# Patient Record
Sex: FEMALE | Race: WHITE | NOT HISPANIC OR LATINO | Employment: OTHER | URBAN - METROPOLITAN AREA
[De-identification: names, ages, dates, MRNs, and addresses within clinical notes are randomized per-mention and may not be internally consistent; named-entity substitution may affect disease eponyms.]

---

## 2017-03-15 RX ORDER — OMEPRAZOLE 20 MG/1
CAPSULE, DELAYED RELEASE ORAL
Qty: 90 CAP | Refills: 2 | Status: SHIPPED | OUTPATIENT
Start: 2017-03-15 | End: 2017-12-10 | Stop reason: SDUPTHER

## 2017-03-15 RX ORDER — OXYBUTYNIN CHLORIDE 5 MG/1
TABLET ORAL
Qty: 180 TAB | Refills: 2 | Status: SHIPPED | OUTPATIENT
Start: 2017-03-15 | End: 2017-12-10 | Stop reason: SDUPTHER

## 2017-03-15 RX ORDER — AMLODIPINE BESYLATE 10 MG/1
TABLET ORAL
Qty: 90 TAB | Refills: 2 | Status: SHIPPED | OUTPATIENT
Start: 2017-03-15 | End: 2017-12-10 | Stop reason: SDUPTHER

## 2017-03-22 RX ORDER — LEVOTHYROXINE SODIUM 0.03 MG/1
TABLET ORAL
Qty: 90 TAB | Refills: 0 | Status: SHIPPED | OUTPATIENT
Start: 2017-03-22 | End: 2017-06-19 | Stop reason: SDUPTHER

## 2017-05-09 ENCOUNTER — HOSPITAL ENCOUNTER (OUTPATIENT)
Dept: RADIOLOGY | Facility: MEDICAL CENTER | Age: 82
End: 2017-05-09
Attending: SPECIALIST
Payer: MEDICARE

## 2017-05-09 DIAGNOSIS — C50.912 MALIGNANT NEOPLASM OF LEFT FEMALE BREAST, UNSPECIFIED SITE OF BREAST: ICD-10-CM

## 2017-05-09 PROCEDURE — G0204 DX MAMMO INCL CAD BI: HCPCS

## 2017-05-11 ENCOUNTER — HOSPITAL ENCOUNTER (OUTPATIENT)
Dept: LAB | Facility: MEDICAL CENTER | Age: 82
End: 2017-05-11
Attending: SPECIALIST
Payer: MEDICARE

## 2017-05-11 LAB
ALBUMIN SERPL BCP-MCNC: 4.6 G/DL (ref 3.2–4.9)
ALBUMIN/GLOB SERPL: 1.8 G/DL
ALP SERPL-CCNC: 73 U/L (ref 30–99)
ALT SERPL-CCNC: 19 U/L (ref 2–50)
ANION GAP SERPL CALC-SCNC: 7 MMOL/L (ref 0–11.9)
AST SERPL-CCNC: 24 U/L (ref 12–45)
BASOPHILS # BLD AUTO: 1 % (ref 0–1.8)
BASOPHILS # BLD: 0.05 K/UL (ref 0–0.12)
BILIRUB SERPL-MCNC: 0.8 MG/DL (ref 0.1–1.5)
BUN SERPL-MCNC: 17 MG/DL (ref 8–22)
CALCIUM SERPL-MCNC: 9.5 MG/DL (ref 8.5–10.5)
CHLORIDE SERPL-SCNC: 94 MMOL/L (ref 96–112)
CO2 SERPL-SCNC: 28 MMOL/L (ref 20–33)
CREAT SERPL-MCNC: 1.01 MG/DL (ref 0.5–1.4)
EOSINOPHIL # BLD AUTO: 0.12 K/UL (ref 0–0.51)
EOSINOPHIL NFR BLD: 2.3 % (ref 0–6.9)
ERYTHROCYTE [DISTWIDTH] IN BLOOD BY AUTOMATED COUNT: 39.8 FL (ref 35.9–50)
GFR SERPL CREATININE-BSD FRML MDRD: 53 ML/MIN/1.73 M 2
GLOBULIN SER CALC-MCNC: 2.6 G/DL (ref 1.9–3.5)
GLUCOSE SERPL-MCNC: 99 MG/DL (ref 65–99)
HCT VFR BLD AUTO: 39.9 % (ref 37–47)
HGB BLD-MCNC: 13.5 G/DL (ref 12–16)
IMM GRANULOCYTES # BLD AUTO: 0.01 K/UL (ref 0–0.11)
IMM GRANULOCYTES NFR BLD AUTO: 0.2 % (ref 0–0.9)
LYMPHOCYTES # BLD AUTO: 1.38 K/UL (ref 1–4.8)
LYMPHOCYTES NFR BLD: 26.7 % (ref 22–41)
MCH RBC QN AUTO: 29.3 PG (ref 27–33)
MCHC RBC AUTO-ENTMCNC: 33.8 G/DL (ref 33.6–35)
MCV RBC AUTO: 86.6 FL (ref 81.4–97.8)
MONOCYTES # BLD AUTO: 0.47 K/UL (ref 0–0.85)
MONOCYTES NFR BLD AUTO: 9.1 % (ref 0–13.4)
NEUTROPHILS # BLD AUTO: 3.14 K/UL (ref 2–7.15)
NEUTROPHILS NFR BLD: 60.7 % (ref 44–72)
NRBC # BLD AUTO: 0 K/UL
NRBC BLD AUTO-RTO: 0 /100 WBC
PLATELET # BLD AUTO: 229 K/UL (ref 164–446)
PMV BLD AUTO: 12.1 FL (ref 9–12.9)
POTASSIUM SERPL-SCNC: 3.8 MMOL/L (ref 3.6–5.5)
PROT SERPL-MCNC: 7.2 G/DL (ref 6–8.2)
RBC # BLD AUTO: 4.61 M/UL (ref 4.2–5.4)
SODIUM SERPL-SCNC: 129 MMOL/L (ref 135–145)
WBC # BLD AUTO: 5.2 K/UL (ref 4.8–10.8)

## 2017-05-11 PROCEDURE — 80053 COMPREHEN METABOLIC PANEL: CPT

## 2017-05-11 PROCEDURE — 36415 COLL VENOUS BLD VENIPUNCTURE: CPT

## 2017-05-11 PROCEDURE — 85025 COMPLETE CBC W/AUTO DIFF WBC: CPT

## 2017-06-23 RX ORDER — HYDROCHLOROTHIAZIDE 12.5 MG/1
TABLET ORAL
Qty: 90 TAB | Refills: 0 | Status: SHIPPED | OUTPATIENT
Start: 2017-06-23 | End: 2017-09-20 | Stop reason: SDUPTHER

## 2017-09-22 RX ORDER — HYDROCHLOROTHIAZIDE 12.5 MG/1
12.5 TABLET ORAL DAILY
Qty: 90 TAB | Refills: 1 | Status: SHIPPED | OUTPATIENT
Start: 2017-09-22 | End: 2018-02-01

## 2017-09-22 NOTE — TELEPHONE ENCOUNTER
Refill X 6 months, sent to pharmacy.Pt. Seen in the last 6 months per protocol.   Lab Results   Component Value Date/Time    SODIUM 129 (L) 05/11/2017 11:26 AM    POTASSIUM 3.8 05/11/2017 11:26 AM    CHLORIDE 94 (L) 05/11/2017 11:26 AM    CO2 28 05/11/2017 11:26 AM    GLUCOSE 99 05/11/2017 11:26 AM    BUN 17 05/11/2017 11:26 AM    CREATININE 1.01 05/11/2017 11:26 AM    CREATININE 1.3 11/06/2007 12:42 PM

## 2018-02-01 ENCOUNTER — OFFICE VISIT (OUTPATIENT)
Dept: CARDIOLOGY | Facility: MEDICAL CENTER | Age: 83
End: 2018-02-01
Payer: COMMERCIAL

## 2018-02-01 VITALS
HEIGHT: 66 IN | HEART RATE: 83 BPM | SYSTOLIC BLOOD PRESSURE: 140 MMHG | OXYGEN SATURATION: 95 % | BODY MASS INDEX: 25.23 KG/M2 | WEIGHT: 157 LBS | DIASTOLIC BLOOD PRESSURE: 66 MMHG

## 2018-02-01 DIAGNOSIS — E78.00 HYPERCHOLESTEREMIA: ICD-10-CM

## 2018-02-01 DIAGNOSIS — I50.32 CHRONIC DIASTOLIC CONGESTIVE HEART FAILURE (HCC): ICD-10-CM

## 2018-02-01 DIAGNOSIS — I10 ESSENTIAL HYPERTENSION: ICD-10-CM

## 2018-02-01 DIAGNOSIS — I35.0 AORTIC VALVE STENOSIS, ETIOLOGY OF CARDIAC VALVE DISEASE UNSPECIFIED: ICD-10-CM

## 2018-02-01 LAB — EKG IMPRESSION: NORMAL

## 2018-02-01 PROCEDURE — 93000 ELECTROCARDIOGRAM COMPLETE: CPT | Performed by: INTERNAL MEDICINE

## 2018-02-01 PROCEDURE — 99205 OFFICE O/P NEW HI 60 MIN: CPT | Mod: 25 | Performed by: INTERNAL MEDICINE

## 2018-02-01 RX ORDER — ALENDRONATE SODIUM 35 MG/1
70 TABLET ORAL
COMMUNITY

## 2018-02-01 RX ORDER — POTASSIUM CHLORIDE 1.5 G/1.58G
20 POWDER, FOR SOLUTION ORAL DAILY
COMMUNITY

## 2018-02-01 RX ORDER — LEVOTHYROXINE SODIUM 112 UG/1
75 TABLET ORAL
COMMUNITY

## 2018-02-01 RX ORDER — ZINC 25 MG
1 TABLET ORAL DAILY
COMMUNITY

## 2018-02-01 RX ORDER — WARFARIN SODIUM 2 MG/1
2 TABLET ORAL 3 TIMES DAILY
Status: ON HOLD | COMMUNITY
End: 2018-03-21

## 2018-02-01 RX ORDER — ATORVASTATIN CALCIUM 40 MG/1
40 TABLET, FILM COATED ORAL NIGHTLY
COMMUNITY

## 2018-02-01 RX ORDER — RANITIDINE 150 MG/1
150 CAPSULE ORAL
COMMUNITY

## 2018-02-01 RX ORDER — METOPROLOL TARTRATE 50 MG/1
50 TABLET, FILM COATED ORAL 2 TIMES DAILY
COMMUNITY

## 2018-02-01 RX ORDER — MULTIVIT-MIN/IRON/FOLIC ACID/K 18-600-40
1 CAPSULE ORAL DAILY
COMMUNITY

## 2018-02-01 ASSESSMENT — ENCOUNTER SYMPTOMS
INSOMNIA: 1
EYES NEGATIVE: 1
NAUSEA: 0
CLAUDICATION: 0
DEPRESSION: 0
SORE THROAT: 1
PALPITATIONS: 0
MYALGIAS: 0
VOMITING: 0
HEADACHES: 0
CHILLS: 0
BACK PAIN: 1
COUGH: 1
WEIGHT LOSS: 1
DOUBLE VISION: 0
ABDOMINAL PAIN: 1
TINGLING: 1
WEAKNESS: 1
FOCAL WEAKNESS: 0
NERVOUS/ANXIOUS: 0
HEARTBURN: 1
BLURRED VISION: 0
MEMORY LOSS: 1
CONSTIPATION: 1
DIZZINESS: 1
SHORTNESS OF BREATH: 1
FEVER: 0
BRUISES/BLEEDS EASILY: 0

## 2018-02-01 NOTE — LETTER
[facility]   [address], [address]  [place] [address]  Phone: [phone]  Fax: [phone]              Verónica Krishnan  1935    Encounter Date: 2/1/2018    Napoleon Pepe M.D.          PROGRESS NOTE:  Subjective:   Verónica Krishnan is a 82 y.o. female who presents today for interventional consult/TAVR evaluation requested by Tai Ramesh for severe symptomatic aortic stenosis.    Thank you for allowing me to evaluate Mrs. Krishnan, who as you know is a 82 year old female with cardiac condition including aortic stenosis. She was recently admitted to [facility] for congestive heart failure. She admits to mild shortness of breath, dyspnea on exertion, fatigue, chest pain, dizziness.    Past Medical History:   Diagnosis Date   • Aortic stenosis    • Breath shortness    • Cancer (CMS-HCC) 2015    breast   • Dental disorder     upper and lower plate   • Heart burn    • Heart valve disease    • Hyperlipidemia    • Hypertension    • Hypoglycemia 2015    patient reports   • Indigestion    • Jaundice 1937   • Pneumonia    • Unspecified cataract     bilateral IOL   • Unspecified urinary incontinence    • Urinary bladder disorder      Past Surgical History:   Procedure Laterality Date   • MASTECTOMY Left 6/17/2015    Procedure: MASTECTOMY PARTIAL;  Surgeon: Kathy Holley M.D.;  Location: SURGERY SAME DAY [facility] ORS;  Service:    • NODE BIOPSY SENTINEL Left 6/17/2015    Procedure: NODE BIOPSY SENTINEL W/LYMPH NODE MAPPING;  Surgeon: Kathy Holley M.D.;  Location: SURGERY SAME DAY [facility] ORS;  Service:      Family History   Problem Relation Age of Onset   • Heart Attack Mother      History   Smoking Status   • Former Smoker   • Years: 15.00   • Types: Cigarettes   • Quit date: 1/1/1970   Smokeless Tobacco   • Never Used     Allergies   Allergen Reactions   • Aluminum      Sore throat, nausea, dizzy   • Amlodipine    • Iron Vomiting, Nausea and  Swelling   • Lisinopril Swelling   • Other Food      Oranges     Medications reviewed.    Outpatient Encounter Prescriptions as of 2/1/2018   Medication Sig Dispense Refill   • alendronate (FOSAMAX) 35 MG tablet Take 35 mg by mouth every 7 days.     • atorvastatin (LIPITOR) 40 MG Tab Take 40 mg by mouth every evening.     • Furosemide 40 MG/4ML Solution Take  by mouth.     • levothyroxine (SYNTHROID) 112 MCG Tab Take 112 mcg by mouth Every morning on an empty stomach.     • metoprolol (LOPRESSOR) 50 MG Tab Take 50 mg by mouth 2 times a day.     • potassium chloride (KLOR-CON) 20 MEQ Pack Take 20 mEq by mouth 2 times a day.     • ranitidine (ZANTAC) 150 MG capsule Take  by mouth.     • warfarin (COUMADIN) 2 MG Tab Take 2 mg by mouth every day. 3 tabs four days a week, 2.5 tabs three times a week     • Ascorbic Acid (VITAMIN C) 500 MG Cap Take  by mouth.     • aspirin 81 MG tablet Take 81 mg by mouth every day.     • LUTEIN PO Take 4 mg by mouth.     • MAGNESIUM CITRATE PO Take  by mouth.     • Zinc 25 MG Tab Take  by mouth.     • oxybutynin (DITROPAN) 5 MG Tab TAKE 1 TABLET TWICE A  Tab 0   • anastrozole (ARIMIDEX) 1 MG Tab      • diphenhydrAMINE (BENADRYL) 25 MG TABS Take 25 mg by mouth at bedtime as needed for Sleep.     • Probiotic Product (PROBIOTIC ACIDOPHILUS BEADS PO) Take  by mouth every day.     • Calcium Carbonate 600 MG TABS Take  by mouth every day.     • Cholecalciferol (VITAMIN D) 400 UNIT TABS Take 400 Units by mouth every day.     • Cyanocobalamin (VITAMIN B-12) 5000 MCG SUBL Place  under tongue every day.     • Non Formulary Request Hydrauronic Acid   500mg  Once a day     • [DISCONTINUED] omeprazole (PRILOSEC) 20 MG delayed-release capsule TAKE 1 CAPSULE DAILY 90 Cap 0   • [DISCONTINUED] amlodipine (NORVASC) 10 MG Tab TAKE 1 TABLET DAILY 90 Tab 0   • [DISCONTINUED] hydrochlorothiazide (HYDRODIURIL) 12.5 MG tablet Take 1 Tab by mouth every day. 90 Tab 1   • [DISCONTINUED] levothyroxine  (SYNTHROID) 25 MCG Tab TAKE 1 TABLET EVERY MORNING ON AN EMPTY STOMACH (DUE FOR LABS/FOLLOW UP FOR FURTHER REFILL) 30 Tab 0   • [DISCONTINUED] oxycodone immediate-release (ROXICODONE) 5 MG TABS Take 1 Tab by mouth every four hours as needed for Severe Pain. 30 Tab 0   • CRANBERRY CONCENTRATE PO Take 2,500 mg by mouth every day.     • [DISCONTINUED] Non Formulary Request Relacore Extra  3 pills in the am     • [DISCONTINUED] loratadine (CLARITIN) 10 MG TABS Take 10 mg by mouth every day.     • [DISCONTINUED] Aloe Vera 500 MG CAPS Take  by mouth. States taking 5000 mg 2 pills in the am     • [DISCONTINUED] Garlic 1000 MG CAPS Take  by mouth every day.     • [DISCONTINUED] Ginger, Zingiber officinalis, 550 MG CAPS Take  by mouth every day.     • [DISCONTINUED] Non Formulary Request Fennel Seed  480 mg  daily     • [DISCONTINUED] Zinc Gluconate 50 MG CAPS Take 25 mg by mouth every day.     • [DISCONTINUED] Misc Natural Products (LUTEIN 20 PO) Take  by mouth every day.     • [DISCONTINUED] Non Formulary Request Sea Kelp   450 mg daily     • [DISCONTINUED] Non Formulary Request Tumeric 555mg  daily     • [DISCONTINUED] Non Formulary Request Iodine  30mg once  daily     • [DISCONTINUED] Non Formulary Request Senol    Once a day     • [DISCONTINUED] Omega-3 Fatty Acids (OMEGA-3 PLUS PO) Take  by mouth every day.     • [DISCONTINUED] Non Formulary Request All Day Energy Drink   Once daily       No facility-administered encounter medications on file as of 2/1/2018.      Review of Systems   Constitutional: Positive for malaise/fatigue and weight loss. Negative for chills and fever.   HENT: Positive for congestion and sore throat. Negative for hearing loss.    Eyes: Negative.  Negative for blurred vision and double vision.   Respiratory: Positive for cough and shortness of breath.    Cardiovascular: Positive for chest pain and leg swelling. Negative for palpitations and claudication.   Gastrointestinal: Positive for abdominal  "pain, constipation and heartburn. Negative for nausea and vomiting.   Genitourinary: Negative.  Negative for dysuria and urgency.   Musculoskeletal: Positive for back pain and joint pain. Negative for myalgias.   Skin: Positive for itching. Negative for rash.   Neurological: Positive for dizziness, tingling and weakness. Negative for focal weakness and headaches.   Endo/Heme/Allergies: Positive for environmental allergies. Does not bruise/bleed easily.   Psychiatric/Behavioral: Positive for memory loss. Negative for depression. The patient has insomnia. The patient is not nervous/anxious.         Objective:   /66   Pulse 83   Ht 1.676 m (5' 6\")   Wt 71.2 kg (157 lb)   SpO2 95%   BMI 25.34 kg/m²      Physical Exam   Constitutional: She is oriented to person, place, and time. She appears well-developed and well-nourished.   HENT:   Head: Normocephalic and atraumatic.   Eyes: Conjunctivae are normal. Pupils are equal, round, and reactive to light.   Neck: Normal range of motion. Neck supple.   Cardiovascular: Normal rate and regular rhythm.    Murmur heard.  Pulmonary/Chest: Effort normal and breath sounds normal.   Abdominal: Soft. Bowel sounds are normal.   Musculoskeletal: Normal range of motion. She exhibits no edema.   Neurological: She is alert and oriented to person, place, and time.   Skin: Skin is warm and dry.   Psychiatric: She has a normal mood and affect.     CARDIAC STUDIES/PROCEDURES:    CAROTID ULTRASOUND (12/13/17)  Carotid ultrasound showing mild plaques.    EKG was ordered for aortic stenosis, performed on (02/01/18) and reviewed: EKG shows sinus rhythm.    Assessment:     1. Aortic valve stenosis, etiology of cardiac valve disease unspecified     2. Chronic diastolic congestive heart failure (CMS-HCC)     3. Essential hypertension     4. Hypercholesteremia       Medical Decision Making:  Today's Assessment / Status / Plan:     1. Aortic stenosis: She is symptomatic, NYHA class II. We will " obtain her echocardiogram and follow up with her. If her aortic stenosis is severe, we will proceed with cardiac catheterization and refer her to Dr. Vargas for probable TAVR vs SAVR.  2. Chronic diastolic congestive heart failure: The overall volume status is adequate.  3. Hypertension: Blood pressure is well controlled.  4. Hyperlipidemia: She is doing well on statin therapy without myalgia symptoms.  More than 45 minutes of time was spent to review all above information, discuss the option of cardiac catheterization and TAVR including, alternative options, risk and benefits.    The risks, benefits, and alternatives to coronary angiography with IV sedation were discussed in great detail. Specific risks mentioned include bleeding, infection, kidney damage, allergic reaction, cardiac perforation with possible tamponade requiring celi-cardiocentesis or possible open heart surgery. In addition, we discussed that 10% of patients will experience small to moderate bruising at the side of the arterial puncture. Lastly the risks of heart attack, stroke, and death were discussed; the risks of major complications such as heart attack or stroke caused by the angiogram is less than 1%; the risk of death is approximately 1 in 1000. The patient verbalized understanding of these potential complications and wishes to proceed with this procedure.    We will follow up in one month in valve clinic.    Thank you for this consult.                  No Recipients

## 2018-02-02 NOTE — PROGRESS NOTES
Subjective:   Verónica Krishnan is a 82 y.o. female who presents today for interventional consult/TAVR evaluation requested by Tai Ramesh for severe symptomatic aortic stenosis.    Thank you for allowing me to evaluate Mrs. Krishnan, who as you know is a 82 year old female with cardiac condition including aortic stenosis. She was recently admitted to [facility] for congestive heart failure. She admits to mild shortness of breath, dyspnea on exertion, fatigue, chest pain, dizziness.    Past Medical History:   Diagnosis Date   • Aortic stenosis    • Breath shortness    • Cancer (CMS-HCC) 2015    breast   • Dental disorder     upper and lower plate   • Heart burn    • Heart valve disease    • Hyperlipidemia    • Hypertension    • Hypoglycemia 2015    patient reports   • Indigestion    • Jaundice 1937   • Pneumonia    • Unspecified cataract     bilateral IOL   • Unspecified urinary incontinence    • Urinary bladder disorder      Past Surgical History:   Procedure Laterality Date   • MASTECTOMY Left 6/17/2015    Procedure: MASTECTOMY PARTIAL;  Surgeon: Kathy Holley M.D.;  Location: SURGERY SAME DAY [facility] ORS;  Service:    • NODE BIOPSY SENTINEL Left 6/17/2015    Procedure: NODE BIOPSY SENTINEL W/LYMPH NODE MAPPING;  Surgeon: Kathy Holley M.D.;  Location: SURGERY SAME DAY [facility] ORS;  Service:      Family History   Problem Relation Age of Onset   • Heart Attack Mother      History   Smoking Status   • Former Smoker   • Years: 15.00   • Types: Cigarettes   • Quit date: 1/1/1970   Smokeless Tobacco   • Never Used     Allergies   Allergen Reactions   • Aluminum      Sore throat, nausea, dizzy   • Amlodipine    • Iron Vomiting, Nausea and Swelling   • Lisinopril Swelling   • Other Food      Oranges     Medications reviewed.    Outpatient Encounter Prescriptions as of 2/1/2018   Medication Sig Dispense Refill   • alendronate (FOSAMAX) 35 MG tablet Take 35 mg by mouth every 7 days.     • atorvastatin  (LIPITOR) 40 MG Tab Take 40 mg by mouth every evening.     • Furosemide 40 MG/4ML Solution Take  by mouth.     • levothyroxine (SYNTHROID) 112 MCG Tab Take 112 mcg by mouth Every morning on an empty stomach.     • metoprolol (LOPRESSOR) 50 MG Tab Take 50 mg by mouth 2 times a day.     • potassium chloride (KLOR-CON) 20 MEQ Pack Take 20 mEq by mouth 2 times a day.     • ranitidine (ZANTAC) 150 MG capsule Take  by mouth.     • warfarin (COUMADIN) 2 MG Tab Take 2 mg by mouth every day. 3 tabs four days a week, 2.5 tabs three times a week     • Ascorbic Acid (VITAMIN C) 500 MG Cap Take  by mouth.     • aspirin 81 MG tablet Take 81 mg by mouth every day.     • LUTEIN PO Take 4 mg by mouth.     • MAGNESIUM CITRATE PO Take  by mouth.     • Zinc 25 MG Tab Take  by mouth.     • oxybutynin (DITROPAN) 5 MG Tab TAKE 1 TABLET TWICE A  Tab 0   • anastrozole (ARIMIDEX) 1 MG Tab      • diphenhydrAMINE (BENADRYL) 25 MG TABS Take 25 mg by mouth at bedtime as needed for Sleep.     • Probiotic Product (PROBIOTIC ACIDOPHILUS BEADS PO) Take  by mouth every day.     • Calcium Carbonate 600 MG TABS Take  by mouth every day.     • Cholecalciferol (VITAMIN D) 400 UNIT TABS Take 400 Units by mouth every day.     • Cyanocobalamin (VITAMIN B-12) 5000 MCG SUBL Place  under tongue every day.     • Non Formulary Request Hydrauronic Acid   500mg  Once a day     • [DISCONTINUED] omeprazole (PRILOSEC) 20 MG delayed-release capsule TAKE 1 CAPSULE DAILY 90 Cap 0   • [DISCONTINUED] amlodipine (NORVASC) 10 MG Tab TAKE 1 TABLET DAILY 90 Tab 0   • [DISCONTINUED] hydrochlorothiazide (HYDRODIURIL) 12.5 MG tablet Take 1 Tab by mouth every day. 90 Tab 1   • [DISCONTINUED] levothyroxine (SYNTHROID) 25 MCG Tab TAKE 1 TABLET EVERY MORNING ON AN EMPTY STOMACH (DUE FOR LABS/FOLLOW UP FOR FURTHER REFILL) 30 Tab 0   • [DISCONTINUED] oxycodone immediate-release (ROXICODONE) 5 MG TABS Take 1 Tab by mouth every four hours as needed for Severe Pain. 30 Tab 0   •  CRANBERRY CONCENTRATE PO Take 2,500 mg by mouth every day.     • [DISCONTINUED] Non Formulary Request Relacore Extra  3 pills in the am     • [DISCONTINUED] loratadine (CLARITIN) 10 MG TABS Take 10 mg by mouth every day.     • [DISCONTINUED] Aloe Vera 500 MG CAPS Take  by mouth. States taking 5000 mg 2 pills in the am     • [DISCONTINUED] Garlic 1000 MG CAPS Take  by mouth every day.     • [DISCONTINUED] Sandra, Zingiber officinalis, 550 MG CAPS Take  by mouth every day.     • [DISCONTINUED] Non Formulary Request Fennel Seed  480 mg  daily     • [DISCONTINUED] Zinc Gluconate 50 MG CAPS Take 25 mg by mouth every day.     • [DISCONTINUED] Misc Natural Products (LUTEIN 20 PO) Take  by mouth every day.     • [DISCONTINUED] Non Formulary Request Sea Kelp   450 mg daily     • [DISCONTINUED] Non Formulary Request Tumeric 555mg  daily     • [DISCONTINUED] Non Formulary Request Iodine  30mg once  daily     • [DISCONTINUED] Non Formulary Request Senol    Once a day     • [DISCONTINUED] Omega-3 Fatty Acids (OMEGA-3 PLUS PO) Take  by mouth every day.     • [DISCONTINUED] Non Formulary Request All Day Energy Drink   Once daily       No facility-administered encounter medications on file as of 2/1/2018.      Review of Systems   Constitutional: Positive for malaise/fatigue and weight loss. Negative for chills and fever.   HENT: Positive for congestion and sore throat. Negative for hearing loss.    Eyes: Negative.  Negative for blurred vision and double vision.   Respiratory: Positive for cough and shortness of breath.    Cardiovascular: Positive for chest pain and leg swelling. Negative for palpitations and claudication.   Gastrointestinal: Positive for abdominal pain, constipation and heartburn. Negative for nausea and vomiting.   Genitourinary: Negative.  Negative for dysuria and urgency.   Musculoskeletal: Positive for back pain and joint pain. Negative for myalgias.   Skin: Positive for itching. Negative for rash.  "  Neurological: Positive for dizziness, tingling and weakness. Negative for focal weakness and headaches.   Endo/Heme/Allergies: Positive for environmental allergies. Does not bruise/bleed easily.   Psychiatric/Behavioral: Positive for memory loss. Negative for depression. The patient has insomnia. The patient is not nervous/anxious.         Objective:   /66   Pulse 83   Ht 1.676 m (5' 6\")   Wt 71.2 kg (157 lb)   SpO2 95%   BMI 25.34 kg/m²     Physical Exam   Constitutional: She is oriented to person, place, and time. She appears well-developed and well-nourished.   HENT:   Head: Normocephalic and atraumatic.   Eyes: Conjunctivae are normal. Pupils are equal, round, and reactive to light.   Neck: Normal range of motion. Neck supple.   Cardiovascular: Normal rate and regular rhythm.    Murmur heard.  Pulmonary/Chest: Effort normal and breath sounds normal.   Abdominal: Soft. Bowel sounds are normal.   Musculoskeletal: Normal range of motion. She exhibits no edema.   Neurological: She is alert and oriented to person, place, and time.   Skin: Skin is warm and dry.   Psychiatric: She has a normal mood and affect.     CARDIAC STUDIES/PROCEDURES:    CAROTID ULTRASOUND (12/13/17)  Carotid ultrasound showing mild plaques.    EKG was ordered for aortic stenosis, performed on (02/01/18) and reviewed: EKG shows sinus rhythm.    Assessment:     1. Aortic valve stenosis, etiology of cardiac valve disease unspecified     2. Chronic diastolic congestive heart failure (CMS-HCC)     3. Essential hypertension     4. Hypercholesteremia       Medical Decision Making:  Today's Assessment / Status / Plan:     1. Aortic stenosis: She is symptomatic, NYHA class II. We will obtain her echocardiogram and follow up with her. If her aortic stenosis is severe, we will proceed with cardiac catheterization and refer her to Dr. Vargas for probable TAVR vs SAVR.  2. Chronic diastolic congestive heart failure: The overall volume status " is adequate.  3. Hypertension: Blood pressure is well controlled.  4. Hyperlipidemia: She is doing well on statin therapy without myalgia symptoms.  More than 45 minutes of time was spent to review all above information, discuss the option of cardiac catheterization and TAVR including, alternative options, risk and benefits.    The risks, benefits, and alternatives to coronary angiography with IV sedation were discussed in great detail. Specific risks mentioned include bleeding, infection, kidney damage, allergic reaction, cardiac perforation with possible tamponade requiring celi-cardiocentesis or possible open heart surgery. In addition, we discussed that 10% of patients will experience small to moderate bruising at the side of the arterial puncture. Lastly the risks of heart attack, stroke, and death were discussed; the risks of major complications such as heart attack or stroke caused by the angiogram is less than 1%; the risk of death is approximately 1 in 1000. The patient verbalized understanding of these potential complications and wishes to proceed with this procedure.    We will follow up in one month in valve clinic.    Thank you for this consult.

## 2018-03-08 ENCOUNTER — OFFICE VISIT (OUTPATIENT)
Dept: CARDIOLOGY | Facility: MEDICAL CENTER | Age: 83
End: 2018-03-08
Payer: COMMERCIAL

## 2018-03-08 ENCOUNTER — TELEPHONE (OUTPATIENT)
Dept: CARDIOLOGY | Facility: MEDICAL CENTER | Age: 83
End: 2018-03-08

## 2018-03-08 VITALS
SYSTOLIC BLOOD PRESSURE: 146 MMHG | WEIGHT: 161 LBS | DIASTOLIC BLOOD PRESSURE: 70 MMHG | OXYGEN SATURATION: 94 % | BODY MASS INDEX: 25.88 KG/M2 | HEIGHT: 66 IN | HEART RATE: 74 BPM

## 2018-03-08 DIAGNOSIS — I82.401 DEEP VEIN THROMBOSIS (DVT) OF RIGHT LOWER EXTREMITY, UNSPECIFIED CHRONICITY, UNSPECIFIED VEIN (HCC): ICD-10-CM

## 2018-03-08 DIAGNOSIS — I35.0 AORTIC VALVE STENOSIS, ETIOLOGY OF CARDIAC VALVE DISEASE UNSPECIFIED: ICD-10-CM

## 2018-03-08 DIAGNOSIS — Z79.01 CHRONIC ANTICOAGULATION: ICD-10-CM

## 2018-03-08 DIAGNOSIS — I50.32 CHRONIC DIASTOLIC CONGESTIVE HEART FAILURE (HCC): ICD-10-CM

## 2018-03-08 PROBLEM — I82.409 DVT (DEEP VENOUS THROMBOSIS) (HCC): Status: ACTIVE | Noted: 2018-03-08

## 2018-03-08 PROCEDURE — 99215 OFFICE O/P EST HI 40 MIN: CPT | Performed by: INTERNAL MEDICINE

## 2018-03-08 RX ORDER — OMEPRAZOLE 20 MG/1
20 CAPSULE, DELAYED RELEASE ORAL DAILY
Status: ON HOLD | COMMUNITY
End: 2018-04-30

## 2018-03-08 RX ORDER — PEPSIN/PAN ENZYME/BETAINE
TABLET ORAL
Status: ON HOLD | COMMUNITY
End: 2018-03-21

## 2018-03-08 ASSESSMENT — ENCOUNTER SYMPTOMS
DEPRESSION: 0
VOMITING: 0
HEARTBURN: 1
TINGLING: 1
ABDOMINAL PAIN: 1
WEAKNESS: 1
SHORTNESS OF BREATH: 1
BRUISES/BLEEDS EASILY: 0
BLURRED VISION: 0
DIZZINESS: 1
CLAUDICATION: 0
WEIGHT LOSS: 1
MEMORY LOSS: 1
CHILLS: 0
HEADACHES: 0
MYALGIAS: 0
COUGH: 1
FEVER: 0
FOCAL WEAKNESS: 0
NERVOUS/ANXIOUS: 0
DOUBLE VISION: 0
PALPITATIONS: 0
EYES NEGATIVE: 1
INSOMNIA: 1
NAUSEA: 0
CONSTIPATION: 1
SORE THROAT: 1
BACK PAIN: 1

## 2018-03-08 NOTE — LETTER
[facility]   [address], [facility] [address]  [place] [address]  Phone: [phone]  Fax: [phone]              Verónica Krishnan  1935    Encounter Date: 3/8/2018    Napoleon Pepe M.D.          PROGRESS NOTE:  Subjective:   Verónica Krishnan is a 82 y.o. female who presents today for follow up of severe symptomatic aortic stenosis.    Since the patient's last visit on 02/01/18, she has continued to experience mild shortness of breath, dyspnea on exertion, fatigue, chest pain, dizziness.    Past Medical History:   Diagnosis Date   • Aortic stenosis    • Breath shortness    • Cancer (CMS-HCC) 2015    breast   • Dental disorder     upper and lower plate   • Heart burn    • Heart valve disease    • Hyperlipidemia    • Hypertension    • Hypoglycemia 2015    patient reports   • Indigestion    • Jaundice 1937   • Pneumonia    • Unspecified cataract     bilateral IOL   • Unspecified urinary incontinence    • Urinary bladder disorder      Past Surgical History:   Procedure Laterality Date   • MASTECTOMY Left 6/17/2015    Procedure: MASTECTOMY PARTIAL;  Surgeon: Kathy Holley M.D.;  Location: SURGERY SAME DAY [facility];  Service:    • NODE BIOPSY SENTINEL Left 6/17/2015    Procedure: NODE BIOPSY SENTINEL W/LYMPH NODE MAPPING;  Surgeon: Kathy Holley M.D.;  Location: SURGERY SAME DAY [facility]S;  Service:      Family History   Problem Relation Age of Onset   • Heart Attack Mother      History   Smoking Status   • Former Smoker   • Years: 15.00   • Types: Cigarettes   • Quit date: 1/1/1970   Smokeless Tobacco   • Never Used     Allergies   Allergen Reactions   • Aluminum      Sore throat, nausea, dizzy   • Amlodipine    • Iron Vomiting, Nausea and Swelling   • Lisinopril Swelling   • Other Food      Oranges     Medications reviewed.    Outpatient Encounter Prescriptions as of 3/8/2018   Medication Sig Dispense Refill   • omeprazole (PRILOSEC) 20 MG  delayed-release capsule Take 20 mg by mouth every day.     • Digestive Enzymes (ACIDOLL) Cap Take  by mouth.     • alendronate (FOSAMAX) 35 MG tablet Take 35 mg by mouth every 7 days.     • atorvastatin (LIPITOR) 40 MG Tab Take 40 mg by mouth every evening.     • Furosemide 40 MG/4ML Solution Take  by mouth.     • levothyroxine (SYNTHROID) 112 MCG Tab Take 112 mcg by mouth Every morning on an empty stomach.     • metoprolol (LOPRESSOR) 50 MG Tab Take 50 mg by mouth 2 times a day.     • potassium chloride (KLOR-CON) 20 MEQ Pack Take 20 mEq by mouth 2 times a day.     • ranitidine (ZANTAC) 150 MG capsule Take  by mouth.     • warfarin (COUMADIN) 2 MG Tab Take 2 mg by mouth 3 times a day. 3 tabs four days a week, 2.5 tabs three times a week      • Ascorbic Acid (VITAMIN C) 500 MG Cap Take  by mouth.     • aspirin 81 MG tablet Take 81 mg by mouth every day.     • LUTEIN PO Take 4 mg by mouth.     • MAGNESIUM CITRATE PO Take  by mouth.     • Zinc 25 MG Tab Take  by mouth.     • oxybutynin (DITROPAN) 5 MG Tab TAKE 1 TABLET TWICE A  Tab 0   • anastrozole (ARIMIDEX) 1 MG Tab      • diphenhydrAMINE (BENADRYL) 25 MG TABS Take 25 mg by mouth at bedtime as needed for Sleep.     • Probiotic Product (PROBIOTIC ACIDOPHILUS BEADS PO) Take  by mouth every day.     • Calcium Carbonate 600 MG TABS Take  by mouth every day.     • Cholecalciferol (VITAMIN D) 400 UNIT TABS Take 400 Units by mouth 2 Times a Day.     • Cyanocobalamin (VITAMIN B-12) 5000 MCG SUBL Place  under tongue every day.     • CRANBERRY CONCENTRATE PO Take 2,500 mg by mouth every day.     • Non Formulary Request Hydrauronic Acid   500mg  Once a day       No facility-administered encounter medications on file as of 3/8/2018.      Review of Systems   Constitutional: Positive for malaise/fatigue and weight loss. Negative for chills and fever.   HENT: Positive for congestion and sore throat. Negative for hearing loss.    Eyes: Negative.  Negative for blurred vision  "and double vision.   Respiratory: Positive for cough and shortness of breath.    Cardiovascular: Positive for chest pain and leg swelling. Negative for palpitations and claudication.   Gastrointestinal: Positive for abdominal pain, constipation and heartburn. Negative for nausea and vomiting.   Genitourinary: Negative.  Negative for dysuria and urgency.   Musculoskeletal: Positive for back pain and joint pain. Negative for myalgias.   Skin: Positive for itching. Negative for rash.   Neurological: Positive for dizziness, tingling and weakness. Negative for focal weakness and headaches.   Endo/Heme/Allergies: Positive for environmental allergies. Does not bruise/bleed easily.   Psychiatric/Behavioral: Positive for memory loss. Negative for depression. The patient has insomnia. The patient is not nervous/anxious.         Objective:   /70   Pulse 74   Ht 1.676 m (5' 6\")   Wt 73 kg (161 lb)   SpO2 94%   BMI 25.99 kg/m²      Physical Exam   Constitutional: She is oriented to person, place, and time. She appears well-developed and well-nourished.   HENT:   Head: Normocephalic and atraumatic.   Eyes: Conjunctivae are normal. Pupils are equal, round, and reactive to light.   Neck: Normal range of motion. Neck supple.   Cardiovascular: Normal rate and regular rhythm.    Murmur heard.  Pulmonary/Chest: Effort normal and breath sounds normal.   Abdominal: Soft. Bowel sounds are normal.   Musculoskeletal: Normal range of motion. She exhibits no edema.   Neurological: She is alert and oriented to person, place, and time.   Skin: Skin is warm and dry.   Psychiatric: She has a normal mood and affect.     CARDIAC STUDIES/PROCEDURES:    ECHOCARDIOGRAM CONCLUSIONS [facility] (11/28/17)  Echocardiogram showing low normal left ventricular systolic function, ejection fraction of 50%, severe aortic stenosis with peak velocity of 4.7 m/s, peak gradient of 91 mmHg, mean gradient of 57 mmHg and calculated JULES of 0.6-0.7 cm2, trace " aortic regurgitation, mild mitral regurgitation, mild tricuspid regurgitation and estimated right ventricular systolic pressure of 33 mmHg.    CAROTID ULTRASOUND (12/13/17)  Carotid ultrasound showing mild plaques.    EKG was ordered for aortic stenosis, performed on (02/01/18) and reviewed: EKG shows sinus rhythm.    Assessment:     1. Aortic valve stenosis, etiology of cardiac valve disease unspecified     2. Chronic diastolic congestive heart failure (CMS-HCC)       Medical Decision Making:  Today's Assessment / Status / Plan:     1. Aortic stenosis: She is symptomatic, NYHA class II. We will obtain her echocardiogram and follow up with her. If her aortic stenosis is severe, we will proceed with cardiac catheterization and refer her to Dr. Vargas for probable TAVR.  2. Chronic diastolic congestive heart failure: The overall volume status is adequate.  3. Hypertension: Blood pressure is well controlled.  4. Hyperlipidemia: She is doing well on statin therapy without myalgia symptoms.  More than 45 minutes of time was spent to review all above information, discuss the option of cardiac catheterization and TAVR including, alternative options, risk and benefits.    The risks, benefits, and alternatives to coronary angiography with IV sedation were discussed in great detail. Specific risks mentioned include bleeding, infection, kidney damage, allergic reaction, cardiac perforation with possible tamponade requiring celi-cardiocentesis or possible open heart surgery. In addition, we discussed that 10% of patients will experience small to moderate bruising at the side of the arterial puncture. Lastly the risks of heart attack, stroke, and death were discussed; the risks of major complications such as heart attack or stroke caused by the angiogram is less than 1%; the risk of death is approximately 1 in 1000. The patient verbalized understanding of these potential complications and wishes to proceed with this  procedure.    We will follow up in one month in valve clinic.    CC Tai Ramesh                     No Recipients

## 2018-03-08 NOTE — PROGRESS NOTES
Subjective:   Verónica Krishnan is a 82 y.o. female who presents today for follow up of severe symptomatic aortic stenosis.    Since the patient's last visit on 02/01/18, she has continued to experience mild shortness of breath, dyspnea on exertion, fatigue, chest pain, dizziness.    Past Medical History:   Diagnosis Date   • Aortic stenosis    • Breath shortness    • Cancer (CMS-[facility]) 2015    breast   • Dental disorder     upper and lower plate   • Heart burn    • Heart valve disease    • Hyperlipidemia    • Hypertension    • Hypoglycemia 2015    patient reports   • Indigestion    • Jaundice 1937   • Pneumonia    • Unspecified cataract     bilateral IOL   • Unspecified urinary incontinence    • Urinary bladder disorder      Past Surgical History:   Procedure Laterality Date   • MASTECTOMY Left 6/17/2015    Procedure: MASTECTOMY PARTIAL;  Surgeon: Kathy Holley M.D.;  Location: SURGERY SAME DAY [facility] ORS;  Service:    • NODE BIOPSY SENTINEL Left 6/17/2015    Procedure: NODE BIOPSY SENTINEL W/LYMPH NODE MAPPING;  Surgeon: Kathy Holley M.D.;  Location: SURGERY SAME DAY [facility] ORS;  Service:      Family History   Problem Relation Age of Onset   • Heart Attack Mother      History   Smoking Status   • Former Smoker   • Years: 15.00   • Types: Cigarettes   • Quit date: 1/1/1970   Smokeless Tobacco   • Never Used     Allergies   Allergen Reactions   • Aluminum      Sore throat, nausea, dizzy   • Amlodipine    • Iron Vomiting, Nausea and Swelling   • Lisinopril Swelling   • Other Food      Oranges     Medications reviewed.    Outpatient Encounter Prescriptions as of 3/8/2018   Medication Sig Dispense Refill   • omeprazole (PRILOSEC) 20 MG delayed-release capsule Take 20 mg by mouth every day.     • Digestive Enzymes (ACIDOLL) Cap Take  by mouth.     • alendronate (FOSAMAX) 35 MG tablet Take 35 mg by mouth every 7 days.     • atorvastatin (LIPITOR) 40 MG Tab Take 40 mg by mouth every evening.     •  Furosemide 40 MG/4ML Solution Take  by mouth.     • levothyroxine (SYNTHROID) 112 MCG Tab Take 112 mcg by mouth Every morning on an empty stomach.     • metoprolol (LOPRESSOR) 50 MG Tab Take 50 mg by mouth 2 times a day.     • potassium chloride (KLOR-CON) 20 MEQ Pack Take 20 mEq by mouth 2 times a day.     • ranitidine (ZANTAC) 150 MG capsule Take  by mouth.     • warfarin (COUMADIN) 2 MG Tab Take 2 mg by mouth 3 times a day. 3 tabs four days a week, 2.5 tabs three times a week      • Ascorbic Acid (VITAMIN C) 500 MG Cap Take  by mouth.     • aspirin 81 MG tablet Take 81 mg by mouth every day.     • LUTEIN PO Take 4 mg by mouth.     • MAGNESIUM CITRATE PO Take  by mouth.     • Zinc 25 MG Tab Take  by mouth.     • oxybutynin (DITROPAN) 5 MG Tab TAKE 1 TABLET TWICE A  Tab 0   • anastrozole (ARIMIDEX) 1 MG Tab      • diphenhydrAMINE (BENADRYL) 25 MG TABS Take 25 mg by mouth at bedtime as needed for Sleep.     • Probiotic Product (PROBIOTIC ACIDOPHILUS BEADS PO) Take  by mouth every day.     • Calcium Carbonate 600 MG TABS Take  by mouth every day.     • Cholecalciferol (VITAMIN D) 400 UNIT TABS Take 400 Units by mouth 2 Times a Day.     • Cyanocobalamin (VITAMIN B-12) 5000 MCG SUBL Place  under tongue every day.     • Non Formulary Request Hydrauronic Acid   500mg  Once a day     • [DISCONTINUED] CRANBERRY CONCENTRATE PO Take 2,500 mg by mouth every day.       No facility-administered encounter medications on file as of 3/8/2018.      Review of Systems   Constitutional: Positive for malaise/fatigue and weight loss. Negative for chills and fever.   HENT: Positive for congestion and sore throat. Negative for hearing loss.    Eyes: Negative.  Negative for blurred vision and double vision.   Respiratory: Positive for cough and shortness of breath.    Cardiovascular: Positive for chest pain and leg swelling. Negative for palpitations and claudication.   Gastrointestinal: Positive for abdominal pain, constipation and  "heartburn. Negative for nausea and vomiting.   Genitourinary: Negative.  Negative for dysuria and urgency.   Musculoskeletal: Positive for back pain and joint pain. Negative for myalgias.   Skin: Positive for itching. Negative for rash.   Neurological: Positive for dizziness, tingling and weakness. Negative for focal weakness and headaches.   Endo/Heme/Allergies: Positive for environmental allergies. Does not bruise/bleed easily.   Psychiatric/Behavioral: Positive for memory loss. Negative for depression. The patient has insomnia. The patient is not nervous/anxious.         Objective:   /70   Pulse 74   Ht 1.676 m (5' 6\")   Wt 73 kg (161 lb)   SpO2 94%   BMI 25.99 kg/m²     Physical Exam   Constitutional: She is oriented to person, place, and time. She appears well-developed and well-nourished.   HENT:   Head: Normocephalic and atraumatic.   Eyes: Conjunctivae are normal. Pupils are equal, round, and reactive to light.   Neck: Normal range of motion. Neck supple.   Cardiovascular: Normal rate and regular rhythm.    Murmur heard.  Pulmonary/Chest: Effort normal and breath sounds normal.   Abdominal: Soft. Bowel sounds are normal.   Musculoskeletal: Normal range of motion. She exhibits no edema.   Neurological: She is alert and oriented to person, place, and time.   Skin: Skin is warm and dry.   Psychiatric: She has a normal mood and affect.     CARDIAC STUDIES/PROCEDURES:    ECHOCARDIOGRAM CONCLUSIONS [facility] (11/28/17)  Echocardiogram showing low normal left ventricular systolic function, ejection fraction of 50%, severe aortic stenosis with peak velocity of 4.7 m/s, peak gradient of 91 mmHg, mean gradient of 57 mmHg and calculated JULES of 0.6-0.7 cm2, trace aortic regurgitation, mild mitral regurgitation, mild tricuspid regurgitation and estimated right ventricular systolic pressure of 33 mmHg.    CAROTID ULTRASOUND (12/13/17)  Carotid ultrasound showing mild plaques.    EKG was ordered for aortic stenosis, " performed on (02/01/18) and reviewed: EKG shows sinus rhythm.    Assessment:     1. Aortic valve stenosis, etiology of cardiac valve disease unspecified     2. Chronic diastolic congestive heart failure (CMS-HCC)     3. Deep vein thrombosis (DVT) of right lower extremity, unspecified chronicity, unspecified vein (CMS-HCC)     4. Chronic anticoagulation       Medical Decision Making:  Today's Assessment / Status / Plan:     1. Aortic stenosis: She is symptomatic, NYHA class II. We did obtain her echocardiogram from [facility] and her aortic stenosis is severe, we will proceed with cardiac catheterization and refer her to Dr. Vargas for probable TAVR for her age and recent deep venous thrombosis.  2. Chronic diastolic congestive heart failure: The overall volume status is adequate.  3. Deep venous thrombosis in 12/10/17 at [facility] on (warfarin): She is clinically doing well on chronic anticoagulation therapy.   More than 45 minutes of time was spent to review all above information, discuss the option of cardiac catheterization and TAVR including, alternative options, risk and benefits.    The risks, benefits, and alternatives to coronary angiography with IV sedation were discussed in great detail. Specific risks mentioned include bleeding, infection, kidney damage, allergic reaction, cardiac perforation with possible tamponade requiring celi-cardiocentesis or possible open heart surgery. In addition, we discussed that 10% of patients will experience small to moderate bruising at the side of the arterial puncture. Lastly the risks of heart attack, stroke, and death were discussed; the risks of major complications such as heart attack or stroke caused by the angiogram is less than 1%; the risk of death is approximately 1 in 1000. The patient verbalized understanding of these potential complications and wishes to proceed with this procedure.    We will follow up in one month in valve clinic.    CC Dr. Baldwin,  Tai

## 2018-03-08 NOTE — TELEPHONE ENCOUNTER
Patient is scheduled on 3-13-18 for a Pre TAVR R&L hrt cath w/poss with Dr. Pepe at [facility]. Patient was told to hold coumadin 5 days prior and to notify coumadin clinic and to hold lasix am of procedure. Patient was told to check in at 9:30 for an 11:30 procedure. H&P was done on 3-8-18 by Dr. Pepe. Pre admit to be done by phone due to patient living out of area.

## 2018-03-13 ENCOUNTER — HOSPITAL ENCOUNTER (OUTPATIENT)
Facility: MEDICAL CENTER | Age: 83
End: 2018-03-13
Attending: INTERNAL MEDICINE | Admitting: INTERNAL MEDICINE
Payer: COMMERCIAL

## 2018-03-13 VITALS
BODY MASS INDEX: 25.83 KG/M2 | TEMPERATURE: 97.2 F | RESPIRATION RATE: 18 BRPM | HEIGHT: 65 IN | WEIGHT: 155 LBS | DIASTOLIC BLOOD PRESSURE: 71 MMHG | OXYGEN SATURATION: 97 % | HEART RATE: 67 BPM | SYSTOLIC BLOOD PRESSURE: 143 MMHG

## 2018-03-13 LAB
ANION GAP SERPL CALC-SCNC: 7 MMOL/L (ref 0–11.9)
BASOPHILS # BLD AUTO: 0.7 % (ref 0–1.8)
BASOPHILS # BLD: 0.04 K/UL (ref 0–0.12)
BUN SERPL-MCNC: 20 MG/DL (ref 8–22)
CALCIUM SERPL-MCNC: 10 MG/DL (ref 8.5–10.5)
CHLORIDE SERPL-SCNC: 101 MMOL/L (ref 96–112)
CO2 SERPL-SCNC: 26 MMOL/L (ref 20–33)
CREAT SERPL-MCNC: 1.1 MG/DL (ref 0.5–1.4)
EOSINOPHIL # BLD AUTO: 0.14 K/UL (ref 0–0.51)
EOSINOPHIL NFR BLD: 2.3 % (ref 0–6.9)
ERYTHROCYTE [DISTWIDTH] IN BLOOD BY AUTOMATED COUNT: 42.2 FL (ref 35.9–50)
GLUCOSE SERPL-MCNC: 102 MG/DL (ref 65–99)
HCT VFR BLD AUTO: 39.8 % (ref 37–47)
HGB BLD-MCNC: 12.9 G/DL (ref 12–16)
IMM GRANULOCYTES # BLD AUTO: 0.02 K/UL (ref 0–0.11)
IMM GRANULOCYTES NFR BLD AUTO: 0.3 % (ref 0–0.9)
INR PPP: 1.13 (ref 0.87–1.13)
LYMPHOCYTES # BLD AUTO: 1.16 K/UL (ref 1–4.8)
LYMPHOCYTES NFR BLD: 19.4 % (ref 22–41)
MCH RBC QN AUTO: 28 PG (ref 27–33)
MCHC RBC AUTO-ENTMCNC: 32.4 G/DL (ref 33.6–35)
MCV RBC AUTO: 86.5 FL (ref 81.4–97.8)
MONOCYTES # BLD AUTO: 0.41 K/UL (ref 0–0.85)
MONOCYTES NFR BLD AUTO: 6.9 % (ref 0–13.4)
NEUTROPHILS # BLD AUTO: 4.21 K/UL (ref 2–7.15)
NEUTROPHILS NFR BLD: 70.4 % (ref 44–72)
NRBC # BLD AUTO: 0 K/UL
NRBC BLD-RTO: 0 /100 WBC
PLATELET # BLD AUTO: 131 K/UL (ref 164–446)
PMV BLD AUTO: 13.2 FL (ref 9–12.9)
POTASSIUM SERPL-SCNC: 4.2 MMOL/L (ref 3.6–5.5)
PROTHROMBIN TIME: 14.2 SEC (ref 12–14.6)
RBC # BLD AUTO: 4.6 M/UL (ref 4.2–5.4)
SODIUM SERPL-SCNC: 134 MMOL/L (ref 135–145)
WBC # BLD AUTO: 6 K/UL (ref 4.8–10.8)

## 2018-03-13 PROCEDURE — C1894 INTRO/SHEATH, NON-LASER: HCPCS

## 2018-03-13 PROCEDURE — 304952 HCHG R 2 PADS

## 2018-03-13 PROCEDURE — 99153 MOD SED SAME PHYS/QHP EA: CPT

## 2018-03-13 PROCEDURE — 99152 MOD SED SAME PHYS/QHP 5/>YRS: CPT

## 2018-03-13 PROCEDURE — 93010 ELECTROCARDIOGRAM REPORT: CPT | Performed by: INTERNAL MEDICINE

## 2018-03-13 PROCEDURE — 93460 R&L HRT ART/VENTRICLE ANGIO: CPT

## 2018-03-13 PROCEDURE — G0278 ILIAC ART ANGIO,CARDIAC CATH: HCPCS

## 2018-03-13 PROCEDURE — 360979 HCHG DIAGNOSTIC CATH

## 2018-03-13 PROCEDURE — 160002 HCHG RECOVERY MINUTES (STAT)

## 2018-03-13 PROCEDURE — 93005 ELECTROCARDIOGRAM TRACING: CPT | Performed by: INTERNAL MEDICINE

## 2018-03-13 PROCEDURE — 80048 BASIC METABOLIC PNL TOTAL CA: CPT

## 2018-03-13 PROCEDURE — 700102 HCHG RX REV CODE 250 W/ 637 OVERRIDE(OP)

## 2018-03-13 PROCEDURE — C1769 GUIDE WIRE: HCPCS

## 2018-03-13 PROCEDURE — 307093 HCHG TR BAND RADIAL

## 2018-03-13 PROCEDURE — 361014 HCHG 6FR ARROW SWAN/THERMO

## 2018-03-13 PROCEDURE — 700111 HCHG RX REV CODE 636 W/ 250 OVERRIDE (IP)

## 2018-03-13 PROCEDURE — 303418 HCHG 4FR ULTIMATE CATHETER

## 2018-03-13 PROCEDURE — 93567 NJX CAR CTH SPRVLV AORTGRPHY: CPT

## 2018-03-13 PROCEDURE — A9270 NON-COVERED ITEM OR SERVICE: HCPCS | Performed by: INTERNAL MEDICINE

## 2018-03-13 PROCEDURE — A9270 NON-COVERED ITEM OR SERVICE: HCPCS

## 2018-03-13 PROCEDURE — 700101 HCHG RX REV CODE 250

## 2018-03-13 PROCEDURE — 700102 HCHG RX REV CODE 250 W/ 637 OVERRIDE(OP): Performed by: INTERNAL MEDICINE

## 2018-03-13 PROCEDURE — 85025 COMPLETE CBC W/AUTO DIFF WBC: CPT

## 2018-03-13 PROCEDURE — 85610 PROTHROMBIN TIME: CPT

## 2018-03-13 RX ORDER — OXYBUTYNIN CHLORIDE 5 MG/1
TABLET ORAL
Refills: 0 | OUTPATIENT
Start: 2018-03-13

## 2018-03-13 RX ORDER — MIDAZOLAM HYDROCHLORIDE 1 MG/ML
INJECTION INTRAMUSCULAR; INTRAVENOUS
Status: COMPLETED
Start: 2018-03-13 | End: 2018-03-13

## 2018-03-13 RX ORDER — DEXAMETHASONE SODIUM PHOSPHATE 4 MG/ML
4 INJECTION, SOLUTION INTRA-ARTICULAR; INTRALESIONAL; INTRAMUSCULAR; INTRAVENOUS; SOFT TISSUE
Status: DISCONTINUED | OUTPATIENT
Start: 2018-03-13 | End: 2018-03-13 | Stop reason: HOSPADM

## 2018-03-13 RX ORDER — ONDANSETRON 2 MG/ML
4 INJECTION INTRAMUSCULAR; INTRAVENOUS EVERY 4 HOURS PRN
Status: DISCONTINUED | OUTPATIENT
Start: 2018-03-13 | End: 2018-03-13 | Stop reason: HOSPADM

## 2018-03-13 RX ORDER — AMLODIPINE BESYLATE 10 MG/1
TABLET ORAL
Refills: 0 | OUTPATIENT
Start: 2018-03-13

## 2018-03-13 RX ORDER — SCOLOPAMINE TRANSDERMAL SYSTEM 1 MG/1
1 PATCH, EXTENDED RELEASE TRANSDERMAL
Status: DISCONTINUED | OUTPATIENT
Start: 2018-03-13 | End: 2018-03-13 | Stop reason: HOSPADM

## 2018-03-13 RX ORDER — VERAPAMIL HYDROCHLORIDE 2.5 MG/ML
INJECTION, SOLUTION INTRAVENOUS
Status: COMPLETED
Start: 2018-03-13 | End: 2018-03-13

## 2018-03-13 RX ORDER — DIPHENHYDRAMINE HYDROCHLORIDE 50 MG/ML
25 INJECTION INTRAMUSCULAR; INTRAVENOUS EVERY 6 HOURS PRN
Status: DISCONTINUED | OUTPATIENT
Start: 2018-03-13 | End: 2018-03-13 | Stop reason: HOSPADM

## 2018-03-13 RX ORDER — OMEPRAZOLE 20 MG/1
CAPSULE, DELAYED RELEASE ORAL
Refills: 0 | OUTPATIENT
Start: 2018-03-13

## 2018-03-13 RX ORDER — LIDOCAINE HYDROCHLORIDE 20 MG/ML
INJECTION, SOLUTION INFILTRATION; PERINEURAL
Status: COMPLETED
Start: 2018-03-13 | End: 2018-03-13

## 2018-03-13 RX ORDER — HEPARIN SODIUM,PORCINE 1000/ML
VIAL (ML) INJECTION
Status: COMPLETED
Start: 2018-03-13 | End: 2018-03-13

## 2018-03-13 RX ORDER — HALOPERIDOL 5 MG/ML
1 INJECTION INTRAMUSCULAR EVERY 6 HOURS PRN
Status: DISCONTINUED | OUTPATIENT
Start: 2018-03-13 | End: 2018-03-13 | Stop reason: HOSPADM

## 2018-03-13 RX ADMIN — LIDOCAINE HYDROCHLORIDE: 20 INJECTION, SOLUTION INFILTRATION; PERINEURAL at 13:05

## 2018-03-13 RX ADMIN — HEPARIN SODIUM: 1000 INJECTION, SOLUTION INTRAVENOUS; SUBCUTANEOUS at 13:05

## 2018-03-13 RX ADMIN — NITROGLYCERIN 10 ML: 20 INJECTION INTRAVENOUS at 13:05

## 2018-03-13 RX ADMIN — FENTANYL CITRATE 150 MCG: 50 INJECTION, SOLUTION INTRAMUSCULAR; INTRAVENOUS at 14:10

## 2018-03-13 RX ADMIN — HEPARIN SODIUM 2000 UNITS: 200 INJECTION, SOLUTION INTRAVENOUS at 13:09

## 2018-03-13 RX ADMIN — ASPIRIN 81 MG: 81 TABLET, COATED ORAL at 15:20

## 2018-03-13 RX ADMIN — MIDAZOLAM 2 MG: 1 INJECTION INTRAMUSCULAR; INTRAVENOUS at 13:28

## 2018-03-13 RX ADMIN — VERAPAMIL HYDROCHLORIDE 2.5 MG: 2.5 INJECTION, SOLUTION INTRAVENOUS at 13:05

## 2018-03-13 RX ADMIN — MIDAZOLAM 1 MG: 1 INJECTION INTRAMUSCULAR; INTRAVENOUS at 14:10

## 2018-03-13 ASSESSMENT — PAIN SCALES - GENERAL
PAINLEVEL_OUTOF10: 0

## 2018-03-13 NOTE — DISCHARGE INSTRUCTIONS
ACTIVITY: Rest and take it easy for the first 24 hours.  A responsible adult is recommended to remain with you during that time.  It is normal to feel sleepy.  We encourage you to not do anything that requires balance, judgment or coordination.    MILD FLU-LIKE SYMPTOMS ARE NORMAL. YOU MAY EXPERIENCE GENERALIZED MUSCLE ACHES, THROAT IRRITATION, HEADACHE AND/OR SOME NAUSEA.    FOR 24 HOURS DO NOT:  Drive, operate machinery or run household appliances.  Drink beer or alcoholic beverages.   Make important decisions or sign legal documents.    SPECIAL INSTRUCTIONS: follow up with primary care physician as needed  If you experience chest pain, shortness of breath call 911 return to ER   Follow up with Dr Vargas with [facility] march 20th at 3pm call [phone] to confirm  [phone]    Follow up with your cardiologist as needed  Resume your home medications  Follow up with coumadin clinic     DIET: To avoid nausea, slowly advance diet as tolerated, avoiding spicy or greasy foods for the first day.  Add more substantial food to your diet according to your physician's instructions.  Babies can be fed formula or breast milk as soon as they are hungry.  INCREASE FLUIDS AND FIBER TO AVOID CONSTIPATION.    SURGICAL DRESSING/BATHING: keep dressing clean dry intact for 24 hours, you may remove dressing after 24 hours.    FOLLOW-UP APPOINTMENT:  A follow-up appointment should be arranged with your doctor in [phone]; call to schedule.    You should CALL YOUR PHYSICIAN if you develop:  Fever greater than 101 degrees F.  Pain not relieved by medication, or persistent nausea or vomiting.  Excessive bleeding (blood soaking through dressing) or unexpected drainage from the wound.  Extreme redness or swelling around the incision site, drainage of pus or foul smelling drainage.  Inability to urinate or empty your bladder within 8 hours.  Problems with breathing or chest pain.    You should call 911 if  you develop problems with breathing or chest pain.  If you are unable to contact your doctor or surgical center, you should go to the nearest emergency room or urgent care center.  Physician's telephone #: [phone]    If any questions arise, call your doctor.  If your doctor is not available, please feel free to call the Surgical Center at [phone].  The Center is open Monday through Friday from 7AM to 7PM.  You can also call the HEALTH HOTLINE open 24 hours/day, 7 days/week and speak to a nurse at [phone], or toll free at [phone].    A registered nurse may call you a few days after your surgery to see how you are doing after your procedure.    MEDICATIONS: Resume taking daily medication.  Take prescribed pain medication with food.  If no medication is prescribed, you may take non-aspirin pain medication if needed.  PAIN MEDICATION CAN BE VERY CONSTIPATING.  Take a stool softener or laxative such as senokot, pericolace, or milk of magnesia if needed.    If your physician has prescribed pain medication that includes Acetaminophen (Tylenol), do not take additional Acetaminophen (Tylenol) while taking the prescribed medication.    Depression / Suicide Risk    As you are discharged from this [facility] facility, it is important to learn how to keep safe from harming yourself.    Recognize the warning signs:  · Abrupt changes in personality, positive or negative- including increase in energy   · Giving away possessions  · Change in eating patterns- significant weight changes-  positive or negative  · Change in sleeping patterns- unable to sleep or sleeping all the time   · Unwillingness or inability to communicate  · Depression  · Unusual sadness, discouragement and loneliness  · Talk of wanting to die  · Neglect of personal appearance   · Rebelliousness- reckless behavior  · Withdrawal from people/activities they love  · Confusion- inability to concentrate     If you or a loved one observes any of  "these behaviors or has concerns about self-harm, here's what you can do:  · Talk about it- your feelings and reasons for harming yourself  · Remove any means that you might use to hurt yourself (examples: pills, rope, extension cords, firearm)  · Get professional help from the community (Mental Health, Substance Abuse, psychological counseling)  · Do not be alone:Call your Safe Contact- someone whom you trust who will be there for you.  · Call your local CRISIS HOTLINE 122-8531 or 765-055-9523  · Call your local Children's Mobile Crisis Response Team [place] (176) 064-4326 or www.[employer]  · Call the toll free National Suicide Prevention Hotlines   · National Suicide Prevention Lifeline 528-453-PHEF (6495)  [employer] 800-SUICIDE (986-8272)  Post Angiogram Groin Care Instructions     INSTRUCTIONS  2. Examine (look and feel) the site of your incision site TODAY so you can recognize changes that should be called to your doctor (see below).  3. Avoid straining either by lifting or pulling objects for 4-5 days. Avoid lifting over 5 pounds.   4. For at least 72 hours, if you should sneeze or cough, please hold pressure over your groin area.  5. If you should begin to have oozing from the catheterization site, please hold firm pressure and call your doctor's office immediately.  6. If profuse bleeding occurs from the catheterization site, hold firm pressure and call \"[phone]\" immediately for assistance.  7. Remove bandage after 24 hours.     ACTIVITY  2. Limit activity as instructed by your doctor.  3. No driving or very limited driving with frequent stops for one week.   4. If you must take a long car ride, stop every hour and walk around the car.   5. Warm showers or baths are permitted after the bandage is removed. Avoid hot showers, baths, hot tubs, and swimming for one week.    PLEASE CALL YOUR DOCTOR IF:  1. Temperature elevation occurs.  2. Catheterization site becomes reddened or begins " to drain.   3. Bruising appears to be new or not resolving. The bruise may move down your leg. This is normal.  4. The small round lump in the groin increases in size.  5. Any leg numbness, aching, or discomfort (immediately).  6. Increasing discomfort in the leg at the insertion site.  7. Chest pains, even if relieved by Nitroglycerin.    MISCELLANEOUS INSTRUCTIONS  1. Bruising may occur as a result of heart catheterization. Some of the discoloration may travel down the leg, going from blue to green in color.  2. A small round lump under the catheterization site will remain for up to six weeks.  3. If any questions arise call your physician's office. You can also call the [facility] HOTLINE open 24 hours/day, 7 days/week and speak to a nurse at [phone], or toll free at [phone].   4. You should call 911 if you develop problems with breathing or chest pain.    FOR PROBLEMS CALL YULI Pepe AT: [phone]    I acknowledge receipt and understanding of these Home Care instructions.  Radial Catherization Discharge Instructions      · Do not subject hand/arm to any forceful movements for 24 hours    i.e. supporting weight when rising from the chair or bed.   · Do not drive a car for 24 hours  · You may remove the dressing tomorrow  · You may shower on the day following your procedure.  Do not take a tub bath or submerge the puncture site in water for 3 days following the procedure.  · No Lifting more than 3-5 pounds with affected wrist for 5 days  · Follow up with  ***  2-4 weeks.  · Increase fluids for 2 days post procedure.  · Continue all previous medications unless otherwise instructed.    If bleeding should occur following discharge:  · Sit down and apply firm pressure to site with your fingers for 10 minutes  · If the bleeding stops, continue to sit quietly, keeping your wrist straight for 2 hours.  Notify physician as soon as possible ( [phone])  · If bleeding does not stop after 10 minutes, or if  there is a large amount of bleeding or spurting, call 911 immediately.  Do not drive yourself to the hospital.

## 2018-03-13 NOTE — PROCEDURES
DATE OF SERVICE:  03/13/2018    REFERRING PHYSICIAN:  Tai Baldwin MD    PROCEDURES:  1.  Right heart catheterization.  2.  Left heart catheterization.  3.  Coronary angiography.  4.  Left ventriculogram.  5.  Ascending aortogram.  6.  Descending aortogram.  7.  Ultrasound-guided right femoral arterial access.    PREPROCEDURE DIAGNOSIS:  Severe symptomatic aortic stenosis, New York Heart   Association class II.    POSTPROCEDURE DIAGNOSES:  1.  Severe aortic stenosis with peak gradient of 100 mmHg, mean gradient of 85   mmHg, calculated JULES of 0.38 cm2.  2.  Severe 3-vessel coronary artery disease with high-grade proximal and mid   LAD stenosis, high-grade ostial proximal diagonal branch stenosis, high-grade   ostial proximal obtuse marginal branch stenosis, and chronic total occlusion   of the mid right coronary artery with distal vessel filling via left to right   collaterals.  3.  Normal left ventricular systolic function with ejection fraction of 60%.  4.  Elevated left ventricular end-diastolic pressure.  5.  Elevated right heart pressure with PA systolic pressure of 50 mmHg.  6.  Mildly dilated ascending aorta with heavily calcified aortic valve.  7.  Heavily calcified descending abdominal aorta with luminal irregularities   of 30-40%, normal size bilateral iliofemoral system with tortuosity.    INDICATION:  The patient is an 82-year-old female with past medical history   significant for severe aortic stenosis, congestive heart failure, history of   DVT on 12/10/2017 on chronic anticoagulation therapy with warfarin.  Due to   her symptoms, she was scheduled for cardiac catheterization.    DESCRIPTION OF PROCEDURE:  After informed consent was signed by the patient,   the patient was brought to the cardiac catheterization laboratory.  She was   prepped and draped in the usual sterile manner.  The right brachial area was   anesthetized with 2% Xylocaine.  A 6-Austrian sheath was inserted into the right    brachial artery over an existing IV.  A 5.5-Egyptian pigtail catheter was   positioned at the pulmonary artery.  Thermodilution cardiac outputs were   obtained.  Right heart pressure measurements were obtained.  The [place]-Rosa   catheter was removed.  The right radial artery area was anesthetized with 2%   Xylocaine.  A 6-Egyptian sheath was inserted into the right radial artery using   the modified Seldinger technique.  An intra-arterial verapamil and   nitroglycerin were given.  IV heparin was given.  A 4-Egyptian pigtail catheter   was positioned at the ascending aorta and aortogram was performed.  This was   exchanged for a JL-4 catheter, which was positioned across the aortic arch and   exchanged over an exchange length wire for the pigtail catheter, which was   positioned at the descending aorta.  The ascending aortogram was performed.    This catheter was exchanged for a JR-4 catheter, which was used to cross the   aortic valve and exchanged over an exchange length wire for a pigtail   catheter, which was positioned into the left ventricle.  Left ventricular   aortic pressure pullback was performed.  At this point, attempts were made to   reposition JL-4 and 3DRC; however, this was not possible due to tortuosity.    The right inguinal area was anesthetized with 2% Xylocaine.  A 4-Egyptian sheath   was inserted into the right femoral artery using the modified Seldinger   technique under ultrasound guidance.  JL-4 and 3DRC catheters were used to   cannulate the left and right coronary arteries respectively.  Coronary   angiography was performed.  The patient tolerated the procedure well.  At the   end of procedure, all catheters and sheaths were removed.  Hemoband was placed   on the right wrist.  She was transferred to PPU in stable condition.    HEMODYNAMIC DATA:  Hemodynamic data shows right heart pressures of RA 35 mmHg,   RV 50/7 with mean of 15 mmHg, PA 50/20 with mean of 35 mmHg, pulmonary wedge   25 mmHg.   Cardiac output by thermodilution 4.0, cardiac index 3.25.  Left   heart pressures /60 with mean of 105 mmHg and /80 with LVEDP of 30   mmHg.    Aortic valve peak-to-peak gradient 100 mmHg, mean gradient of 85 mmHg,   calculated JULES 0.38 cm2.    LEFT VENTRICULOGRAM:  A 10 mL of contrast was delivered for 2 seconds.  The   ejection fraction was estimated to be 60%.  There was no segmental wall motion   abnormalities noted.    ASCENDING AORTOGRAM:  A 10 mL of contrast was delivered for 1 second.  Mildly   dilated ascending aorta and heavily calcified aortic valve was noted.    DESCENDING AORTOGRAM:  A 10 mL of contrast was delivered for 2 seconds.    Heavily calcified descending aorta of moderate size with mild atherosclerotic   _____ 20-30%.  Bilateral iliofemoral system was moderate in size with some   tortuosity.  No significant stenosis.    ANGIOGRAM:  LEFT MAIN CORONARY ARTERY:  Left main coronary artery is a long large-caliber   vessel free of disease.  LEFT ANTERIOR DESCENDING ARTERY:  Left anterior descending artery is a long   moderate-to-large caliber vessel which wraps around the apex.  Proximal   portion of the vessel, there is a concentric 80% stenosis.  It then has   diffuse stenosis of 50-60%, followed by another 70-80% stenosis in the mid   portion.  There is a moderate-caliber bifurcating diagonal branch noted with   ostial proximal concentric 99% stenosis.  LEFT CIRCUMFLEX ARTERY:  Left circumflex artery is a nondominant large-caliber   vessel with long moderate tortuous obtuse marginal branch with ostial   concentric 99% stenosis and a large bifurcating distal obtuse marginal branch   noted free of disease.  RIGHT CORONARY ARTERY:  Right coronary artery is a dominant small-caliber   vessel, which is diffusely diseased until the mid portion where it is   chronically occluded.  Distally, small posterior descending artery fills via   left to right collateral.    IMPRESSION:  1.  Severe  aortic stenosis with peak gradient of 100 mmHg, mean gradient of 85   mmHg, calculated JULES of 0.38 cm2.  2.  Severe 3-vessel coronary artery disease with high-grade proximal and mid   LAD stenosis, high-grade ostial proximal diagonal branch stenosis, high-grade   ostial proximal obtuse marginal branch stenosis, and chronic total occlusion   of the mid right coronary artery with distal vessel filling via left to right   collaterals.  3.  Normal left ventricular systolic function with ejection fraction of 60%.  4.  Elevated left ventricular end-diastolic pressure.  5.  Elevated right heart pressure with PA systolic pressure of 50 mmHg.  6.  Mildly dilated ascending aorta with heavily calcified aortic valve.  7.  Heavily calcified descending abdominal aorta with luminal irregularities   of 30-40%, normal size bilateral iliofemoral system with tortuosity.    RECOMMENDATIONS:  Recommend to consult with Dr. Vargas regarding aortic   valve replacement with coronary artery bypass graft surgery versus multivessel   percutaneous coronary intervention and transcatheter aortic valve   replacement.       ____________________________________     MD CRISTAL MORELOS / ROLAND    DD:  03/13/2018 14:16:22  DT:  03/13/2018 14:47:56    D#:  [id number]  Job#:  [id number]

## 2018-03-14 ENCOUNTER — TELEPHONE (OUTPATIENT)
Dept: CARDIOLOGY | Facility: MEDICAL CENTER | Age: 83
End: 2018-03-14

## 2018-03-14 ENCOUNTER — TELEPHONE (OUTPATIENT)
Dept: VASCULAR LAB | Facility: MEDICAL CENTER | Age: 83
End: 2018-03-14

## 2018-03-14 DIAGNOSIS — Z79.01 CHRONIC ANTICOAGULATION: ICD-10-CM

## 2018-03-14 DIAGNOSIS — I82.401 DEEP VEIN THROMBOSIS (DVT) OF RIGHT LOWER EXTREMITY, UNSPECIFIED CHRONICITY, UNSPECIFIED VEIN (HCC): ICD-10-CM

## 2018-03-14 LAB — EKG IMPRESSION: NORMAL

## 2018-03-14 NOTE — TELEPHONE ENCOUNTER
Received referral from Dr. Pepe for warfarin.   Spoke with pt. She states that she just received a call from Afshin's office that she's to be off warfarin until 3/20 for procedure.     So at this point will wait until after procedure.     Shalonda Turpin, PharmD

## 2018-03-14 NOTE — TELEPHONE ENCOUNTER
----- Message from Napoleon Pepe M.D. sent at 3/13/2018  2:50 PM PDT -----  Please schedule for percutaneous intervention by me for Tuesday and get TAVR work up urgently per Dr. Vargas.    Thanks.  SEBASTIAN

## 2018-03-14 NOTE — OR NURSING
1430 patient arrived from cath lab s/p right and left heart cath, right groin acces dressing clean dry intact, Tr band to right radial no bleeding, right brachial dressing clean, patient wide awake, denies pain, shortness of breath, plan of care discussed with patient and family agreeable.  1500 patient tolerating oral fluids and snacks.  1530 3 cc remove from Tr band site clean  1545 3 cc removed from Tr band site clean  1600 3 cc removed from Tr band site clean  1615 3 cc removed from Tr band site clean vss.  1630 Tr band removed gauze and tegaderm applied no bleeding, no hematoma, vss  1850 discharge instructions given to patient, patient verbalize understanding of the orders, iv discontinued tip intact, all surgical dressings clean dry intact.  1858 patient escorted via w/c with all her personal belongings.

## 2018-03-14 NOTE — TELEPHONE ENCOUNTER
Patient is scheduled on 3-20-18 for a PCI with Dr. Pepe at [facility]. Patient was told to hold coumadin 5 days prior, hold lasix am of procedure. Patient was told to check in at 9:30 for an 11:30 procedure. H&P was done on 3-8-18 by Dr. Pepe. Pre admit to be done by phone due to patient living out of area.

## 2018-03-20 ENCOUNTER — HOSPITAL ENCOUNTER (OUTPATIENT)
Facility: MEDICAL CENTER | Age: 83
End: 2018-03-21
Attending: INTERNAL MEDICINE | Admitting: INTERNAL MEDICINE
Payer: COMMERCIAL

## 2018-03-20 LAB
ANION GAP SERPL CALC-SCNC: 7 MMOL/L (ref 0–11.9)
BUN SERPL-MCNC: 17 MG/DL (ref 8–22)
CALCIUM SERPL-MCNC: 9.1 MG/DL (ref 8.5–10.5)
CHLORIDE SERPL-SCNC: 104 MMOL/L (ref 96–112)
CO2 SERPL-SCNC: 25 MMOL/L (ref 20–33)
CREAT SERPL-MCNC: 0.77 MG/DL (ref 0.5–1.4)
EKG IMPRESSION: NORMAL
EKG IMPRESSION: NORMAL
ERYTHROCYTE [DISTWIDTH] IN BLOOD BY AUTOMATED COUNT: 44.5 FL (ref 35.9–50)
GLUCOSE SERPL-MCNC: 98 MG/DL (ref 65–99)
HCT VFR BLD AUTO: 34.1 % (ref 37–47)
HGB BLD-MCNC: 11 G/DL (ref 12–16)
INR PPP: 1.04 (ref 0.87–1.13)
MCH RBC QN AUTO: 28.4 PG (ref 27–33)
MCHC RBC AUTO-ENTMCNC: 32.3 G/DL (ref 33.6–35)
MCV RBC AUTO: 87.9 FL (ref 81.4–97.8)
PLATELET # BLD AUTO: 105 K/UL (ref 164–446)
PMV BLD AUTO: 13.2 FL (ref 9–12.9)
POTASSIUM SERPL-SCNC: 5.3 MMOL/L (ref 3.6–5.5)
PROTHROMBIN TIME: 13.3 SEC (ref 12–14.6)
RBC # BLD AUTO: 3.88 M/UL (ref 4.2–5.4)
SODIUM SERPL-SCNC: 136 MMOL/L (ref 135–145)
WBC # BLD AUTO: 5.2 K/UL (ref 4.8–10.8)

## 2018-03-20 PROCEDURE — C1874 STENT, COATED/COV W/DEL SYS: HCPCS

## 2018-03-20 PROCEDURE — C1894 INTRO/SHEATH, NON-LASER: HCPCS

## 2018-03-20 PROCEDURE — C1769 GUIDE WIRE: HCPCS

## 2018-03-20 PROCEDURE — 160002 HCHG RECOVERY MINUTES (STAT)

## 2018-03-20 PROCEDURE — C9600 PERC DRUG-EL COR STENT SING: HCPCS | Mod: LC

## 2018-03-20 PROCEDURE — 85610 PROTHROMBIN TIME: CPT

## 2018-03-20 PROCEDURE — 85027 COMPLETE CBC AUTOMATED: CPT

## 2018-03-20 PROCEDURE — 93454 CORONARY ARTERY ANGIO S&I: CPT

## 2018-03-20 PROCEDURE — 700102 HCHG RX REV CODE 250 W/ 637 OVERRIDE(OP)

## 2018-03-20 PROCEDURE — 304952 HCHG R 2 PADS

## 2018-03-20 PROCEDURE — C1725 CATH, TRANSLUMIN NON-LASER: HCPCS

## 2018-03-20 PROCEDURE — G0378 HOSPITAL OBSERVATION PER HR: HCPCS

## 2018-03-20 PROCEDURE — 99153 MOD SED SAME PHYS/QHP EA: CPT

## 2018-03-20 PROCEDURE — A9270 NON-COVERED ITEM OR SERVICE: HCPCS | Performed by: INTERNAL MEDICINE

## 2018-03-20 PROCEDURE — C9600 PERC DRUG-EL COR STENT SING: HCPCS | Mod: LD

## 2018-03-20 PROCEDURE — 93005 ELECTROCARDIOGRAM TRACING: CPT | Performed by: INTERNAL MEDICINE

## 2018-03-20 PROCEDURE — 93010 ELECTROCARDIOGRAM REPORT: CPT | Mod: 76 | Performed by: INTERNAL MEDICINE

## 2018-03-20 PROCEDURE — 700111 HCHG RX REV CODE 636 W/ 250 OVERRIDE (IP)

## 2018-03-20 PROCEDURE — C9601 PERC DRUG-EL COR STENT BRAN: HCPCS | Mod: LD

## 2018-03-20 PROCEDURE — 700102 HCHG RX REV CODE 250 W/ 637 OVERRIDE(OP): Performed by: INTERNAL MEDICINE

## 2018-03-20 PROCEDURE — A9270 NON-COVERED ITEM OR SERVICE: HCPCS

## 2018-03-20 PROCEDURE — C1887 CATHETER, GUIDING: HCPCS

## 2018-03-20 PROCEDURE — C1760 CLOSURE DEV, VASC: HCPCS

## 2018-03-20 PROCEDURE — 80048 BASIC METABOLIC PNL TOTAL CA: CPT

## 2018-03-20 PROCEDURE — A9270 NON-COVERED ITEM OR SERVICE: HCPCS | Performed by: NURSE PRACTITIONER

## 2018-03-20 PROCEDURE — 99152 MOD SED SAME PHYS/QHP 5/>YRS: CPT

## 2018-03-20 PROCEDURE — 700102 HCHG RX REV CODE 250 W/ 637 OVERRIDE(OP): Performed by: NURSE PRACTITIONER

## 2018-03-20 PROCEDURE — 700101 HCHG RX REV CODE 250

## 2018-03-20 RX ORDER — SODIUM CHLORIDE 9 MG/ML
INJECTION, SOLUTION INTRAVENOUS
Status: DISCONTINUED | OUTPATIENT
Start: 2018-03-20 | End: 2018-03-20

## 2018-03-20 RX ORDER — METOPROLOL TARTRATE 50 MG/1
50 TABLET, FILM COATED ORAL TWICE DAILY
Status: DISCONTINUED | OUTPATIENT
Start: 2018-03-20 | End: 2018-03-21 | Stop reason: HOSPADM

## 2018-03-20 RX ORDER — POTASSIUM CHLORIDE 20 MEQ/1
20 TABLET, EXTENDED RELEASE ORAL DAILY
Status: DISCONTINUED | OUTPATIENT
Start: 2018-03-21 | End: 2018-03-21 | Stop reason: HOSPADM

## 2018-03-20 RX ORDER — ATORVASTATIN CALCIUM 40 MG/1
40 TABLET, FILM COATED ORAL NIGHTLY
Status: DISCONTINUED | OUTPATIENT
Start: 2018-03-20 | End: 2018-03-21 | Stop reason: HOSPADM

## 2018-03-20 RX ORDER — HYDROCODONE BITARTRATE AND ACETAMINOPHEN 5; 325 MG/1; MG/1
1-2 TABLET ORAL EVERY 4 HOURS PRN
Status: DISCONTINUED | OUTPATIENT
Start: 2018-03-20 | End: 2018-03-21 | Stop reason: HOSPADM

## 2018-03-20 RX ORDER — BISACODYL 10 MG
10 SUPPOSITORY, RECTAL RECTAL
Status: DISCONTINUED | OUTPATIENT
Start: 2018-03-20 | End: 2018-03-21 | Stop reason: HOSPADM

## 2018-03-20 RX ORDER — MIDAZOLAM HYDROCHLORIDE 1 MG/ML
INJECTION INTRAMUSCULAR; INTRAVENOUS
Status: COMPLETED
Start: 2018-03-20 | End: 2018-03-20

## 2018-03-20 RX ORDER — POTASSIUM CHLORIDE 1.5 G/1.58G
20 POWDER, FOR SOLUTION ORAL 2 TIMES DAILY
Status: DISCONTINUED | OUTPATIENT
Start: 2018-03-20 | End: 2018-03-20

## 2018-03-20 RX ORDER — DIPHENHYDRAMINE HYDROCHLORIDE 50 MG/ML
25 INJECTION INTRAMUSCULAR; INTRAVENOUS EVERY 6 HOURS PRN
Status: DISCONTINUED | OUTPATIENT
Start: 2018-03-20 | End: 2018-03-21 | Stop reason: HOSPADM

## 2018-03-20 RX ORDER — POLYETHYLENE GLYCOL 3350 17 G/17G
1 POWDER, FOR SOLUTION ORAL
Status: DISCONTINUED | OUTPATIENT
Start: 2018-03-20 | End: 2018-03-21 | Stop reason: HOSPADM

## 2018-03-20 RX ORDER — METOPROLOL TARTRATE 50 MG/1
TABLET, FILM COATED ORAL
Status: COMPLETED
Start: 2018-03-20 | End: 2018-03-20

## 2018-03-20 RX ORDER — ONDANSETRON 2 MG/ML
4 INJECTION INTRAMUSCULAR; INTRAVENOUS EVERY 4 HOURS PRN
Status: DISCONTINUED | OUTPATIENT
Start: 2018-03-20 | End: 2018-03-21 | Stop reason: HOSPADM

## 2018-03-20 RX ORDER — LIDOCAINE HYDROCHLORIDE 20 MG/ML
INJECTION, SOLUTION INFILTRATION; PERINEURAL
Status: COMPLETED
Start: 2018-03-20 | End: 2018-03-20

## 2018-03-20 RX ORDER — AMOXICILLIN 250 MG
2 CAPSULE ORAL 2 TIMES DAILY
Status: DISCONTINUED | OUTPATIENT
Start: 2018-03-20 | End: 2018-03-21 | Stop reason: HOSPADM

## 2018-03-20 RX ORDER — METOPROLOL TARTRATE 50 MG/1
50 TABLET, FILM COATED ORAL 2 TIMES DAILY
Status: DISCONTINUED | OUTPATIENT
Start: 2018-03-20 | End: 2018-03-20

## 2018-03-20 RX ORDER — CLOPIDOGREL BISULFATE 75 MG/1
75 TABLET ORAL DAILY
Status: DISCONTINUED | OUTPATIENT
Start: 2018-03-21 | End: 2018-03-21 | Stop reason: HOSPADM

## 2018-03-20 RX ORDER — BIVALIRUDIN 250 MG/5ML
INJECTION, POWDER, LYOPHILIZED, FOR SOLUTION INTRAVENOUS
Status: COMPLETED
Start: 2018-03-20 | End: 2018-03-20

## 2018-03-20 RX ORDER — OMEPRAZOLE 20 MG/1
20 CAPSULE, DELAYED RELEASE ORAL DAILY
Status: DISCONTINUED | OUTPATIENT
Start: 2018-03-21 | End: 2018-03-21 | Stop reason: HOSPADM

## 2018-03-20 RX ORDER — HYDROCODONE BITARTRATE AND ACETAMINOPHEN 5; 325 MG/1; MG/1
TABLET ORAL
Status: COMPLETED
Start: 2018-03-20 | End: 2018-03-20

## 2018-03-20 RX ORDER — FUROSEMIDE 10 MG/ML
40 SOLUTION ORAL DAILY
Status: DISCONTINUED | OUTPATIENT
Start: 2018-03-20 | End: 2018-03-21 | Stop reason: HOSPADM

## 2018-03-20 RX ORDER — HEPARIN SODIUM,PORCINE 1000/ML
VIAL (ML) INJECTION
Status: COMPLETED
Start: 2018-03-20 | End: 2018-03-20

## 2018-03-20 RX ORDER — CLOPIDOGREL 300 MG/1
TABLET, FILM COATED ORAL
Status: COMPLETED
Start: 2018-03-20 | End: 2018-03-20

## 2018-03-20 RX ORDER — SODIUM CHLORIDE 9 MG/ML
INJECTION, SOLUTION INTRAVENOUS CONTINUOUS
Status: ACTIVE | OUTPATIENT
Start: 2018-03-20 | End: 2018-03-20

## 2018-03-20 RX ORDER — SCOLOPAMINE TRANSDERMAL SYSTEM 1 MG/1
1 PATCH, EXTENDED RELEASE TRANSDERMAL
Status: DISCONTINUED | OUTPATIENT
Start: 2018-03-20 | End: 2018-03-21 | Stop reason: HOSPADM

## 2018-03-20 RX ORDER — LEVOTHYROXINE SODIUM 112 UG/1
112 TABLET ORAL
Status: DISCONTINUED | OUTPATIENT
Start: 2018-03-21 | End: 2018-03-21 | Stop reason: HOSPADM

## 2018-03-20 RX ORDER — DEXAMETHASONE SODIUM PHOSPHATE 4 MG/ML
4 INJECTION, SOLUTION INTRA-ARTICULAR; INTRALESIONAL; INTRAMUSCULAR; INTRAVENOUS; SOFT TISSUE
Status: DISCONTINUED | OUTPATIENT
Start: 2018-03-20 | End: 2018-03-21 | Stop reason: HOSPADM

## 2018-03-20 RX ORDER — HALOPERIDOL 5 MG/ML
1 INJECTION INTRAMUSCULAR EVERY 6 HOURS PRN
Status: DISCONTINUED | OUTPATIENT
Start: 2018-03-20 | End: 2018-03-21 | Stop reason: HOSPADM

## 2018-03-20 RX ADMIN — HEPARIN SODIUM: 1000 INJECTION, SOLUTION INTRAVENOUS; SUBCUTANEOUS at 13:44

## 2018-03-20 RX ADMIN — FENTANYL CITRATE 125 MCG: 50 INJECTION, SOLUTION INTRAMUSCULAR; INTRAVENOUS at 14:23

## 2018-03-20 RX ADMIN — CLOPIDOGREL BISULFATE 600 MG: 300 TABLET, FILM COATED ORAL at 14:29

## 2018-03-20 RX ADMIN — LIDOCAINE HYDROCHLORIDE: 20 INJECTION, SOLUTION INFILTRATION; PERINEURAL at 13:44

## 2018-03-20 RX ADMIN — SODIUM CHLORIDE: 9 INJECTION, SOLUTION INTRAVENOUS at 15:00

## 2018-03-20 RX ADMIN — METOPROLOL TARTRATE 50 MG: 50 TABLET, FILM COATED ORAL at 15:56

## 2018-03-20 RX ADMIN — HEPARIN SODIUM 2000 UNITS: 200 INJECTION, SOLUTION INTRAVENOUS at 13:45

## 2018-03-20 RX ADMIN — BIVALIRUDIN 250 MG: 250 INJECTION, POWDER, LYOPHILIZED, FOR SOLUTION INTRAVENOUS at 13:44

## 2018-03-20 RX ADMIN — NITROGLYCERIN 10 ML: 20 INJECTION INTRAVENOUS at 13:44

## 2018-03-20 RX ADMIN — MIDAZOLAM 0.5 MG: 1 INJECTION INTRAMUSCULAR; INTRAVENOUS at 14:22

## 2018-03-20 RX ADMIN — SODIUM CHLORIDE: 9 INJECTION, SOLUTION INTRAVENOUS at 10:00

## 2018-03-20 RX ADMIN — HYDROCODONE BITARTRATE AND ACETAMINOPHEN 2 TABLET: 5; 325 TABLET ORAL at 16:00

## 2018-03-20 RX ADMIN — MIDAZOLAM 2 MG: 1 INJECTION INTRAMUSCULAR; INTRAVENOUS at 13:45

## 2018-03-20 RX ADMIN — ASPIRIN 81 MG: 81 TABLET, COATED ORAL at 15:55

## 2018-03-20 RX ADMIN — ATORVASTATIN CALCIUM 40 MG: 40 TABLET, FILM COATED ORAL at 21:04

## 2018-03-20 ASSESSMENT — PATIENT HEALTH QUESTIONNAIRE - PHQ9
SUM OF ALL RESPONSES TO PHQ QUESTIONS 1-9: 0
2. FEELING DOWN, DEPRESSED, IRRITABLE, OR HOPELESS: NOT AT ALL
1. LITTLE INTEREST OR PLEASURE IN DOING THINGS: NOT AT ALL
SUM OF ALL RESPONSES TO PHQ9 QUESTIONS 1 AND 2: 0

## 2018-03-20 ASSESSMENT — PAIN SCALES - GENERAL
PAINLEVEL_OUTOF10: 0
PAINLEVEL_OUTOF10: 0
PAINLEVEL_OUTOF10: 4
PAINLEVEL_OUTOF10: 0
PAINLEVEL_OUTOF10: 0
PAINLEVEL_OUTOF10: 2
PAINLEVEL_OUTOF10: 0
PAINLEVEL_OUTOF10: 5
PAINLEVEL_OUTOF10: 0
PAINLEVEL_OUTOF10: 3
PAINLEVEL_OUTOF10: 0
PAINLEVEL_OUTOF10: 0

## 2018-03-20 ASSESSMENT — COGNITIVE AND FUNCTIONAL STATUS - GENERAL
SUGGESTED CMS G CODE MODIFIER MOBILITY: CH
MOBILITY SCORE: 24
DAILY ACTIVITIY SCORE: 24
SUGGESTED CMS G CODE MODIFIER DAILY ACTIVITY: CH

## 2018-03-20 ASSESSMENT — LIFESTYLE VARIABLES: EVER_SMOKED: YES

## 2018-03-20 NOTE — OR NURSING
1433   PATIENT RECEIVED FROM CATH LAB,  S/P 2 STENTS IN PROXIMAL LAD/ MID LAD,  1 STENT TO DIAGONAL,  1 STENT TO OM VIA LEFT GROIN.  PATIENT IS A/A/OX4.  DENIES ANY PAIN @ THIS TIME.  FAMILY IS @ BEDSIDE.  ANGIOMAX STARTED IN CATH LAB @ 2.8CC / HR.      1445   CATH SITE CHECKED AND CLEAR. STAT EKG DONE POST STENT.    1500   CATH SITE CHECKED AND CLEAR.    1515   CATH SITE CHECKED AND CLEAR.  PATIENT TAKING PO FLUID AND SNACK OK.    1600   C/O OF CHRONIC BACK PAIN.  CALL TO DR ALLEN,  ORDER RECEIVED AND MEDS GIVEN PER MAR'S.    1700   VERBALIZED PAIN RELIEF.  PATIENT TAKING PO SNACK AND FOOD WITHOUT DIFFICULTY.  REPORT GIVEN TO TYLER.    1730   PATIENT TAKING TO T 811-2 VIA OTTO,  ON MONITOR,  ON 2L OXYGEN NC,  ACCOMPANIED BY RN.

## 2018-03-20 NOTE — PROCEDURES
DATE OF SERVICE:  03/20/2018    REFERRING PHYSICIAN:  Mila Vargas MD and Tai Baldwin MD    PROCEDURE:  1.  Coronary angiography.  2.  PTCA/stent placement of the proximal left anterior descending artery.  3.  PTCA/stent placement of the mid circumflex artery.  4.  PTCA/stent placement of the ostial diagonal branch.  5.  PTCA/stent placement of the ostial OM1 branch.  6.  Ultrasound-guided left femoral arterial access.  7.  Angio-Seal closure.    PREPROCEDURE DIAGNOSIS:  Coronary artery disease, pending TAVR.    POSTPROCEDURE DIAGNOSES:  1.  Multivessel coronary artery disease.  2.  Successful percutaneous transluminal coronary angioplasty/stent placement   of the proximal left anterior descending artery with 2.75x8 mm Synergy   drug-eluting stent.  3.  Successful percutaneous transluminal coronary angioplasty/stent placement   of the mid left anterior descending artery with 2.5x16 mm Synergy drug-eluting stent.  4.  Successful percutaneous transluminal coronary angioplasty/stent placement   of the proximal diagonal branch with 2.5x8 mm Synergy drug-eluting stent.  5.  Successful percutaneous transluminal coronary angioplasty/stent placement   of the ostial first obtuse marginal branch with 2.5x12 mm Synergy drug-eluting stent.    INDICATION:  The patient is an 82-year-old female with past medical history   significant for severe symptomatic aortic stenosis and multivessel coronary   artery disease.  She was evaluated by Dr. Vargas who felt she was not a   candidate for excessive risk for surgical aortic valve replacement with   multivessel coronary artery bypass graft surgery.  She was elected for   percutaneous intervention and TAVR.    PROCEDURE IN DETAIL:  After informed consent was signed by the patient,   the patient was brought to the cardiac catheterization laboratory.  She was   prepped and draped in usual sterile manner.  The left inguinal area was   anesthetized with 2% Xylocaine.  A 6-Frisian  sheath was inserted into the right   femoral artery using the modified Seldinger technique under ultrasound   guidance.  An EBU 3.5 guide catheter was positioned into the left main   coronary artery.  IV Angiomax was started.  A Prowater wire was positioned   into the left anterior descending artery.  A second Prowater wire was   positioned into the diagonal branch.  Over the wire for the left anterior   descending artery, a 2.5x15 mm TREK balloon was used to predilate the mid   lesion.  A 2.5x16 mm Synergy drug-eluting stent was successfully positioned   and deployed.  This stent was postdilated with 2.5x12 mm NC TREK balloon.    Over the Prowater wire to the diagonal branch, a 2.5x8 mm TREK balloon was   used to predilate the identified stenosis.  A 2.5x8 mm Synergy drug-eluting   stent was successfully positioned and deployed.  This wire and balloon was   removed.  Over the Prowater wire in the left anterior descending artery, the   proximal stenosis was predilated with 2.5x8 mm TREK balloon.  A 2.75x8 mm   Synergy drug-eluting stent was successfully positioned and deployed.  The wire   was redirected into the obtuse marginal branch.  The identified stenosis was   predilated with 2.5x8 mm TREK balloon.  A 2.5x12 mm Synergy drug-eluting stent   was successfully positioned and deployed.  This stent was postdilated with   2.5x8 mm NC Emerge balloon.  The patient tolerated the procedure well.  At the   end of the procedure, all wires, balloons, guide, and sheaths were removed.    She was given oral Plavix.  She was transferred to PPU in stable condition.    HEMODYNAMIC DATA:  Hemodynamic data shows aortic pressures of 120/80 with mean   of 100 mmHg.    AORTIC VALVE:  Aortic valve was not crossed.    LEFT VENTRICULOGRAM:  Left ventriculogram was not performed.    ANGIOGRAM:  For complete angiogram results, please refer to my prior cardiac   catheterization.  However, there was high-grade proximal left anterior    descending artery, mid left anterior descending artery, ostial diagonal   branch, and ostial obtuse marginal branch stenosis noted.    PERCUTANEOUS INTERVENTION:  1.  Proximal left anterior descending artery, predilatation with 2.5x8 mm TREK   balloon.  Stent was 2.75x8 mm Synergy drug-eluting stent.  2.  Mid left anterior descending artery, predilatation with 2.5x15 mm TREK   balloon.  Stent was 2.5x16 mm Synergy drug-eluting stent, postdilatation with   2.5x12 mm NC TREK balloons.  3.  Ostial diagonal branch, predilatation with 2.5x8 mm TREK balloon.  Stent   was 2.5x8 mm Synergy drug-eluting stent  4.  Ostial obtuse marginal branch, predilatation with 2.5x8 mm TREK balloon.    Stent was 2.5x12 mm Synergy drug-eluting stent, postdilatation with 2.5x8 mm   NC Emerge balloon.    IMPRESSION:  1.  Multivessel coronary artery disease.  2.  Successful percutaneous transluminal coronary angioplasty/stent placement   of the proximal left anterior descending artery with 2.75x8 mm Synergy   drug-eluting stent.  3.  Successful percutaneous transluminal coronary angioplasty/stent placement   of the mid left anterior descending artery with 2.5x16 mm Synergy drug-eluting   stent.  4.  Successful percutaneous transluminal coronary angioplasty/stent placement   of the proximal diagonal branch with 2.5x8 mm Synergy drug-eluting stent.  5.  Successful percutaneous transluminal coronary angioplasty/stent placement   of the ostial first obtuse marginal branch with 2.5x12 mm Synergy drug-eluting   stent.    RECOMMENDATIONS:  Recommend medical therapy with continuation of Plavix and   proceed with TAVR.       ____________________________________     MD CRISTAL MORELOS / ROLAND    DD:  03/20/2018 14:32:25  DT:  03/20/2018 15:47:41    D#:  [id number]  Job#:  [id number]

## 2018-03-21 ENCOUNTER — PATIENT OUTREACH (OUTPATIENT)
Dept: HEALTH INFORMATION MANAGEMENT | Facility: OTHER | Age: 83
End: 2018-03-21

## 2018-03-21 VITALS
DIASTOLIC BLOOD PRESSURE: 71 MMHG | TEMPERATURE: 97.8 F | RESPIRATION RATE: 16 BRPM | HEART RATE: 60 BPM | HEIGHT: 65 IN | BODY MASS INDEX: 25.99 KG/M2 | OXYGEN SATURATION: 96 % | SYSTOLIC BLOOD PRESSURE: 160 MMHG | WEIGHT: 156 LBS

## 2018-03-21 LAB
ANION GAP SERPL CALC-SCNC: 4 MMOL/L (ref 0–11.9)
BUN SERPL-MCNC: 16 MG/DL (ref 8–22)
CALCIUM SERPL-MCNC: 8.9 MG/DL (ref 8.5–10.5)
CHLORIDE SERPL-SCNC: 103 MMOL/L (ref 96–112)
CO2 SERPL-SCNC: 27 MMOL/L (ref 20–33)
CREAT SERPL-MCNC: 0.96 MG/DL (ref 0.5–1.4)
ERYTHROCYTE [DISTWIDTH] IN BLOOD BY AUTOMATED COUNT: 44 FL (ref 35.9–50)
GLUCOSE SERPL-MCNC: 104 MG/DL (ref 65–99)
HCT VFR BLD AUTO: 31.3 % (ref 37–47)
HGB BLD-MCNC: 10.2 G/DL (ref 12–16)
MCH RBC QN AUTO: 28.7 PG (ref 27–33)
MCHC RBC AUTO-ENTMCNC: 32.6 G/DL (ref 33.6–35)
MCV RBC AUTO: 88.2 FL (ref 81.4–97.8)
PLATELET # BLD AUTO: 130 K/UL (ref 164–446)
PMV BLD AUTO: 13.3 FL (ref 9–12.9)
POTASSIUM SERPL-SCNC: 3.8 MMOL/L (ref 3.6–5.5)
RBC # BLD AUTO: 3.55 M/UL (ref 4.2–5.4)
SODIUM SERPL-SCNC: 134 MMOL/L (ref 135–145)
WBC # BLD AUTO: 5.7 K/UL (ref 4.8–10.8)

## 2018-03-21 PROCEDURE — A9270 NON-COVERED ITEM OR SERVICE: HCPCS | Performed by: NURSE PRACTITIONER

## 2018-03-21 PROCEDURE — 700102 HCHG RX REV CODE 250 W/ 637 OVERRIDE(OP): Performed by: NURSE PRACTITIONER

## 2018-03-21 PROCEDURE — A9270 NON-COVERED ITEM OR SERVICE: HCPCS | Performed by: INTERNAL MEDICINE

## 2018-03-21 PROCEDURE — G0378 HOSPITAL OBSERVATION PER HR: HCPCS

## 2018-03-21 PROCEDURE — 85027 COMPLETE CBC AUTOMATED: CPT

## 2018-03-21 PROCEDURE — 80048 BASIC METABOLIC PNL TOTAL CA: CPT

## 2018-03-21 PROCEDURE — 700102 HCHG RX REV CODE 250 W/ 637 OVERRIDE(OP): Performed by: INTERNAL MEDICINE

## 2018-03-21 PROCEDURE — 36415 COLL VENOUS BLD VENIPUNCTURE: CPT

## 2018-03-21 RX ORDER — CLOPIDOGREL BISULFATE 75 MG/1
75 TABLET ORAL DAILY
Qty: 30 TAB | Refills: 11 | Status: SHIPPED | OUTPATIENT
Start: 2018-03-22 | End: 2018-04-10 | Stop reason: SDUPTHER

## 2018-03-21 RX ORDER — CLOPIDOGREL BISULFATE 75 MG/1
75 TABLET ORAL DAILY
Qty: 30 TAB | Refills: 11 | Status: SHIPPED
Start: 2018-03-22 | End: 2018-03-21

## 2018-03-21 RX ADMIN — FUROSEMIDE 40 MG: 10 SOLUTION ORAL at 08:49

## 2018-03-21 RX ADMIN — LEVOTHYROXINE SODIUM 112 MCG: 112 TABLET ORAL at 05:19

## 2018-03-21 RX ADMIN — OMEPRAZOLE 20 MG: 20 CAPSULE, DELAYED RELEASE ORAL at 08:52

## 2018-03-21 RX ADMIN — POTASSIUM CHLORIDE 20 MEQ: 1500 TABLET, EXTENDED RELEASE ORAL at 08:52

## 2018-03-21 RX ADMIN — ASPIRIN 81 MG: 81 TABLET, COATED ORAL at 08:50

## 2018-03-21 RX ADMIN — METOPROLOL TARTRATE 50 MG: 50 TABLET, FILM COATED ORAL at 08:52

## 2018-03-21 RX ADMIN — CLOPIDOGREL 75 MG: 75 TABLET, FILM COATED ORAL at 08:52

## 2018-03-21 ASSESSMENT — COGNITIVE AND FUNCTIONAL STATUS - GENERAL
SUGGESTED CMS G CODE MODIFIER MOBILITY: CH
SUGGESTED CMS G CODE MODIFIER DAILY ACTIVITY: CH
MOBILITY SCORE: 24
DAILY ACTIVITIY SCORE: 24

## 2018-03-21 ASSESSMENT — PATIENT HEALTH QUESTIONNAIRE - PHQ9
SUM OF ALL RESPONSES TO PHQ9 QUESTIONS 1 AND 2: 0
SUM OF ALL RESPONSES TO PHQ QUESTIONS 1-9: 0
2. FEELING DOWN, DEPRESSED, IRRITABLE, OR HOPELESS: NOT AT ALL
1. LITTLE INTEREST OR PLEASURE IN DOING THINGS: NOT AT ALL

## 2018-03-21 ASSESSMENT — PAIN SCALES - GENERAL
PAINLEVEL_OUTOF10: 0

## 2018-03-21 NOTE — PROGRESS NOTES
Two nurse skin check done with Brett Quinn. Scant edema noted in feet bilaterally, blanchable redness on heels bilat, blanchable redness on coccyx. No skin issues noted otherwise.

## 2018-03-21 NOTE — DIETARY
Nutrition: Consult received for Cardiac Rehab.  Diet education not indicated at this time.  Will follow per department policy.

## 2018-03-21 NOTE — PROGRESS NOTES
Patient discharged at 1330 to home with brother. AVS reviewed with patient, all questions and concerns addressed. Patient aware of follow up appointments. IV removed. VSS upon discharge.

## 2018-03-21 NOTE — DISCHARGE INSTRUCTIONS
Coronary Angiogram With Stent  Coronary angiogram with stent placement is a procedure to widen or open a narrow blood vessel of the heart (coronary artery). Arteries may become blocked by cholesterol buildup (plaques) in the lining or wall. When a coronary artery becomes partially blocked, blood flow to that area decreases. This may lead to chest pain or a heart attack (myocardial infarction).  A stent is a small piece of metal that looks like mesh or a spring. Stent placement may be done as treatment for a heart attack or right after a coronary angiogram in which a blocked artery is found.  Let your health care provider know about:  · Any allergies you have.  · All medicines you are taking, including vitamins, herbs, eye drops, creams, and over-the-counter medicines.  · Any problems you or family members have had with anesthetic medicines.  · Any blood disorders you have.  · Any surgeries you have had.  · Any medical conditions you have.  · Whether you are pregnant or may be pregnant.  What are the risks?  Generally, this is a safe procedure. However, problems may occur, including:  · Damage to the heart or its blood vessels.  · A return of blockage.  · Bleeding, infection, or bruising at the insertion site.  · A collection of blood under the skin (hematoma) at the insertion site.  · A blood clot in another part of the body.  · Kidney injury.  · Allergic reaction to the dye or contrast that is used.  · Bleeding into the abdomen (retroperitoneal bleeding).  What happens before the procedure?  Staying hydrated   Follow instructions from your health care provider about hydration, which may include:  · Up to 2 hours before the procedure - you may continue to drink clear liquids, such as water, clear fruit juice, black coffee, and plain tea.  Eating and drinking restrictions   Follow instructions from your health care provider about eating and drinking, which may include:  · 8 hours before the procedure - stop  eating heavy meals or foods such as meat, fried foods, or fatty foods.  · 6 hours before the procedure - stop eating light meals or foods, such as toast or cereal.  · 2 hours before the procedure - stop drinking clear liquids.  Ask your health care provider about:  · Changing or stopping your regular medicines. This is especially important if you are taking diabetes medicines or blood thinners.  · Taking medicines such as ibuprofen. These medicines can thin your blood. Do not take these medicines before your procedure if your health care provider instructs you not to. Generally, aspirin is recommended before a procedure of passing a small, thin tube (catheter) through a blood vessel and into the heart (cardiac catheterization).  What happens during the procedure?  · An IV tube will be inserted into one of your veins.  · You will be given one or more of the following:  ¨ A medicine to help you relax (sedative).  ¨ A medicine to numb the area where the catheter will be inserted into an artery (local anesthetic).  · To reduce your risk of infection:  ¨ Your health care team will wash or sanitize their hands.  ¨ Your skin will be washed with soap.  ¨ Hair may be removed from the area where the catheter will be inserted.  · Using a guide wire, the catheter will be inserted into an artery. The location may be in your groin, in your wrist, or in the fold of your arm (near your elbow).  · A type of X-ray (fluoroscopy) will be used to help guide the catheter to the opening of the arteries in the heart.  · A dye will be injected into the catheter, and X-rays will be taken. The dye will help to show where any narrowing or blockages are located in the arteries.  · A tiny wire will be guided to the blocked spot, and a balloon will be inflated to make the artery wider.  · The stent will be expanded and will crush the plaques into the wall of the vessel. The stent will hold the area open and improve the blood flow. Most stents  have a drug coating to reduce the risk of the stent narrowing over time.  · The artery may be made wider using a drill, laser, or other tools to remove plaques.  · When the blood flow is better, the catheter will be removed. The lining of the artery will grow over the stent, which stays where it was placed.  This procedure may vary among health care providers and hospitals.  What happens after the procedure?  · If the procedure is done through the leg, you will be kept in bed lying flat for about 6 hours. You will be instructed to not bend and not cross your legs.  · The insertion site will be checked frequently.  · The pulse in your foot or wrist will be checked frequently.  · You may have additional blood tests, X-rays, and a test that records the electrical activity of your heart (electrocardiogram, or ECG).  This information is not intended to replace advice given to you by your health care provider. Make sure you discuss any questions you have with your health care provider.  Document Released: 06/23/2004 Document Revised: 08/17/2017 Document Reviewed: 07/23/2017  [employer] Interactive Patient Education © 2017 [employer]    Coronary Angiogram With Stent, Care After  Refer to this sheet in the next few weeks. These instructions provide you with information about caring for yourself after your procedure. Your health care provider may also give you more specific instructions. Your treatment has been planned according to current medical practices, but problems sometimes occur. Call your health care provider if you have any problems or questions after your procedure.  WHAT TO EXPECT AFTER THE PROCEDURE   After your procedure, it is typical to have the following:  · Bruising at the catheter insertion site that usually fades within 1-2 weeks.  · Blood collecting in the tissue (hematoma) that may be painful to the touch. It should usually decrease in size and tenderness within 1-2 weeks.  HOME CARE  INSTRUCTIONS  · Take medicines only as directed by your health care provider. Blood thinners may be prescribed after your procedure to improve blood flow through the stent.  · You may shower 24-48 hours after the procedure or as directed by your health care provider. Remove the bandage (dressing) and gently wash the catheter insertion site with plain soap and water. Pat the area dry with a clean towel. Do not rub the site, because this may cause bleeding.  · Do not take baths, swim, or use a hot tub until your health care provider approves.  · Check your catheter insertion site every day for redness, swelling, or drainage.  · Do not apply powder or lotion to the site.  · Do not lift over 10 lb (4.5 kg) for 5 days after your procedure or as directed by your health care provider.  · Ask your health care provider when it is okay to:  ¨ Return to work or school.  ¨ Resume usual physical activities or sports.  ¨ Resume sexual activity.  · Eat a heart-healthy diet. This should include plenty of fresh fruits and vegetables. Meat should be lean cuts. Avoid the following types of food:  ¨ Food that is high in salt.  ¨ Canned or highly processed food.  ¨ Food that is high in saturated fat or sugar.  ¨ Fried food.  · Make any other lifestyle changes as recommended by your health care provider. These may include:  ¨ Not using any tobacco products, including cigarettes, chewing tobacco, or electronic cigarettes. If you need help quitting, ask your health care provider.  ¨ Managing your weight.  ¨ Getting regular exercise.  ¨ Managing your blood pressure.  ¨ Limiting your alcohol intake.  ¨ Managing other health problems, such as diabetes.  · If you need an MRI after your heart stent has been placed, be sure to tell the health care provider who orders the MRI that you have a heart stent.  · Keep all follow-up visits as directed by your health care provider. This is important.  SEEK MEDICAL CARE IF:  · You have a fever.  · You  have chills.  · You have increased bleeding from the catheter insertion site. Hold pressure on the site.  SEEK IMMEDIATE MEDICAL CARE IF:  · You develop chest pain or shortness of breath, feel faint, or pass out.  · You have unusual pain at the catheter insertion site.  · You have redness, warmth, or swelling at the catheter insertion site.  · You have drainage (other than a small amount of blood on the dressing) from the catheter insertion site.  · The catheter insertion site is bleeding, and the bleeding does not stop after 30 minutes of holding steady pressure on the site.  · You develop bleeding from any other place, such as from your rectum. There may be bright red blood in your urine or stool, or it may appear as black, tarry stool.     This information is not intended to replace advice given to you by your health care provider. Make sure you discuss any questions you have with your health care provider.     Document Released: 07/07/2006 Document Revised: 01/08/2016 Document Reviewed: 07/06/2015  [employer] Interactive Patient Education ©2016 [employer]              Discharge Instructions    Discharged to home by car with relative. Discharged via wheelchair, hospital escort: Yes.  Special equipment needed: Not Applicable    Be sure to schedule a follow-up appointment with your primary care doctor or any specialists as instructed.     Discharge Plan:   Influenza Vaccine Indication: Not indicated: Previously immunized this influenza season and > 8 years of age    I understand that a diet low in cholesterol, fat, and sodium is recommended for good health. Unless I have been given specific instructions below for another diet, I accept this instruction as my diet prescription.   Other diet: Cardiac    Special Instructions:Stent Instructions    · Is patient discharged on Warfarin / Coumadin?   No     Depression / Suicide Risk    As you are discharged from this [facility] facility, it is important to learn how to  keep safe from harming yourself.    Recognize the warning signs:  · Abrupt changes in personality, positive or negative- including increase in energy   · Giving away possessions  · Change in eating patterns- significant weight changes-  positive or negative  · Change in sleeping patterns- unable to sleep or sleeping all the time   · Unwillingness or inability to communicate  · Depression  · Unusual sadness, discouragement and loneliness  · Talk of wanting to die  · Neglect of personal appearance   · Rebelliousness- reckless behavior  · Withdrawal from people/activities they love  · Confusion- inability to concentrate     If you or a loved one observes any of these behaviors or has concerns about self-harm, here's what you can do:  · Talk about it- your feelings and reasons for harming yourself  · Remove any means that you might use to hurt yourself (examples: pills, rope, extension cords, firearm)  · Get professional help from the community (Mental Health, Substance Abuse, psychological counseling)  · Do not be alone:Call your Safe Contact- someone whom you trust who will be there for you.  · Call your local CRISIS HOTLINE 795-9586 or 956-780-7511  · Call your local Children's Mobile Crisis Response Team [place] (128) 314-5367 or www.Highfive  · Call the toll free National Suicide Prevention Hotlines   · National Suicide Prevention Lifeline 108-179-XOSZ (2918)  · National Hope Line Network 800-SUICIDE (865-0384)

## 2018-03-21 NOTE — CONSULTS
DATE OF SERVICE:  03/21/2018    REFERRING PHYSICIAN:  Napoleon Pepe MD.    REASON FOR CONSULTATION:  Consideration for aortic valve replacement.    CHIEF COMPLAINT:  Shortness of breath.    HISTORY OF PRESENT ILLNESS:  The patient is a very pleasant 82-year-old white   female who complains of worsening shortness of breath, fatigue and chest pain   for the past few months.  She denies any nausea, vomiting, diaphoresis, fever,   or syncope.  She does have some complaints of dizziness.  The patient   underwent cardiac catheterization on 03/20/18, by Dr. Pepe who diagnosed   2-vessel coronary artery disease.  She underwent placement of 4 stents in the   left anterior descending artery and left circumflex artery.  Echocardiography   showed severe aortic stenosis with a peak and mean transvalvular gradient of   91 and 57 mmHg respectively.  The V-max was 4.7 m/sec and her aortic valve   area was calculated at 0.6-0.7 square cm.    PAST MEDICAL HISTORY:  Significant for breast cancer, status post partial   mastectomy, hypertension, dyslipidemia, pneumonia, urinary incontinence.    ALLERGIES:  TO ALUMINUM, AMLODIPINE, IRON, LISINOPRIL, ORANGES.    MEDICATIONS:  Omeprazole 20 mg p.o. daily, Fosamax 35 mg p.o. every 7 days,   Lipitor 40 mg p.o. at bedtime, Synthroid 112 mcg p.o. q.a.m., Lopressor 50 mg   p.o. b.i.d., potassium chloride 20 mEq p.o. b.i.d., ranitidine 150 mg p.o.   daily, furosemide 40 mg p.o. daily, vitamin C 500 mg p.o. daily, aspirin 81 mg   p.o. daily, Ditropan 5 mg p.o. b.i.d.    FAMILY HISTORY:  Her mother had a heart attack.    PSYCHOSOCIAL HISTORY:  The patient has a 15 pack year history of smoking.  She   quit in 1970.    REVIEW OF SYSTEMS:  NEUROLOGIC:  There is no history of strokes.  RESPIRATORY:  Shortness of breath.  CARDIAC:  As per history of present illness.  All the rest of the systems were reviewed with the patient and were negative.    PHYSICAL EXAMINATION:  GENERAL:  Well-developed,  well-nourished, 82-year-old white female, in no   acute distress.  She is alert and oriented x3.  VITAL SIGNS:  Blood pressure is 122/66, heart rate 78 and regular,   respirations 16.  Height 5 feet 5 inches, weight 156 pounds.  NECK:  Supple, without jugular venous distention.  There is no cervical   lymphadenopathy.  HEENT:  Normocephalic, atraumatic.  Extraocular muscles intact.  Her nasal and   oral mucosa are pink and moist.  SKIN:  Normal.  RESPIRATORY:  Lungs are clear to auscultation bilaterally.  CARDIAC:  Regular rate and rhythm with II/VI systolic ejection murmur.  ABDOMEN:  Soft, nondistended, nontender with bowel sounds present.  EXTREMITIES:  There is no clubbing, cyanosis or edema.  VASCULAR:  There are good peripheral pulses bilaterally.  MUSCULOSKELETAL:  There is good range of motion.  NEUROLOGIC:  Grossly intact.    IMPRESSION:  Severe aortic stenosis (calcific or degenerative), coronary artery   disease, status post percutaneous coronary interventions (LAD stent x2, obtuse   marginal stent x1, left circumflex stent x1).    PLAN:  I do not recommend surgical aortic valve replacement due to excessive   risk.  I estimate this to be greater than 5%.  Her STS mortality risk score is   5.8% and her morbidity and mortality risk score is 28.7%.  She is a better   candidate for transcatheter aortic valve replacement and I would recommend   completing her workup.  The risks, benefits, potential complications and   alternative treatments were discussed in detail with the patient including her   risks should she decide not to undergo my recommended treatment.  The patient   understands the risks and she is willing to proceed.  Findings and   recommendations were discussed with the patient's cardiologist, Dr. Pepe.    Thank you for this challenging consultation and participation in the patient's   care.       ____________________________________     MD GABINO MOONEY / ROLAND    DD:  03/21/2018  10:09:35  DT:  03/21/2018 11:07:17    D#:  [id number]  Job#:  [id number]

## 2018-03-22 NOTE — DISCHARGE SUMMARY
CHIEF COMPLAINT ON ADMISSION  Elective Cardiac Catheterization for recent diagnosis of 2-vessel coronary artery disease diagnosed on 3/20/18 by angiogram performed by Dr. Napoleon Pepe as part of pre-TAVR workup.  was consulted and it was decided that the best course of treatment would be multivessel percutaneous coronary intervention.      CODE STATUS  Full    HPI & HOSPITAL COURSE  This is a 82 year old female here for elective cardiac catheterization for multi vessel coronary artery disease. The patient presented to the office with symptoms of worsening shortness of breath, fatigue and chest pain. Significant medical history of severe aortic stenosis, chronic diastolic congestive heart failure and recent DVT in 12/2017. She underwent placement of 4 stents in the left anterior descending artery and left circumflex artery. Echocardiography showed severe aortic stenosis with a peak and mean transvalvular gradient of 91 and 57 mmHg respectively.  The V-max was 4.7 m/sec and her aortic valve area was calculated at 0.6-0.7 square cm. Post-cath, the patient recovered well with no complications.Their left femoral  site was clean, dry, and intact with no signs of hematoma, bleeding, or infection. The bandage was changed by myself with a clear tegaderm dressing placed over the site. Her previous right femoral site is soft with ecchymosis. The patient understands discharge instructions related to lifting precautions with radial site for one week.They were able to ambulate the halls without any exertional angina. Their vitals and laboratory workup were unremarkable. The patient was given thorough discussion of his discharge instructions per nursing and myself. DAPT and statin adherence were discussed in detail. Patient was discharged to home with family with no further questions/concerns, their outpatient follow up has been made by our office.    PROCEDURES  LEFT CARDIAC CATH on 3/20/18    POST-OPERATIVE DIAGNOSES:    1.  Multivessel coronary artery disease.  2.  Successful percutaneous transluminal coronary angioplasty/stent placement   of the proximal left anterior descending artery with 2.75x8 mm Synergy   drug-eluting stent.  3.  Successful percutaneous transluminal coronary angioplasty/stent placement   of the mid left anterior descending artery with 2.5x16 mm Synergy drug-eluting stent.  4.  Successful percutaneous transluminal coronary angioplasty/stent placement   of the proximal diagonal branch with 2.5x8 mm Synergy drug-eluting stent.  5.  Successful percutaneous transluminal coronary angioplasty/stent placement   of the ostial first obtuse marginal branch with 2.5x12 mm Synergy drug-eluting stent.         FOLLOW UP  Future Appointments  Date Time Provider Department Center   4/19/2018 10:00 AM Napoleon Pepe M.D. [facility] None     Bianca Desai, THEODORE.N.P.  [address] [place] [address]  [phone]    Call on 3/22/2018  Please call to schedule your appointment. Thank you       MEDICATIONS ON DISCHARGE   Verónica Krishnan   Home Medication Instructions [id number]:[id number]    Printed on:03/22/18 1223   Medication Information                      alendronate (FOSAMAX) 35 MG tablet  Take 35 mg by mouth every 7 days.             anastrozole (ARIMIDEX) 1 MG Tab               Ascorbic Acid (VITAMIN C) 500 MG Cap  Take  by mouth.             aspirin 81 MG tablet  Take 81 mg by mouth every day.             atorvastatin (LIPITOR) 40 MG Tab  Take 40 mg by mouth every evening.             Calcium Carbonate 600 MG TABS  Take  by mouth every day.             Cholecalciferol (VITAMIN D) 400 UNIT TABS  Take 400 Units by mouth 2 Times a Day.             clopidogrel (PLAVIX) 75 MG Tab  Take 1 Tab by mouth every day.             Cyanocobalamin (VITAMIN B-12) 5000 MCG SUBL  Place  under tongue every day.             diphenhydrAMINE (BENADRYL) 25 MG TABS  Take 25 mg by mouth at bedtime as needed for Sleep.             Furosemide 40  MG/4ML Solution  Take  by mouth.             levothyroxine (SYNTHROID) 112 MCG Tab  Take 112 mcg by mouth Every morning on an empty stomach.             LUTEIN PO  Take 4 mg by mouth.             MAGNESIUM CITRATE PO  Take  by mouth.             metoprolol (LOPRESSOR) 50 MG Tab  Take 50 mg by mouth 2 times a day.             Non Formulary Request  Hydrauronic Acid   500mg  Once a day             omeprazole (PRILOSEC) 20 MG delayed-release capsule  Take 20 mg by mouth every day.             oxybutynin (DITROPAN) 5 MG Tab  TAKE 1 TABLET TWICE A DAY             potassium chloride (KLOR-CON) 20 MEQ Pack  Take 20 mEq by mouth 2 times a day.             Probiotic Product (PROBIOTIC ACIDOPHILUS BEADS PO)  Take  by mouth every day.             ranitidine (ZANTAC) 150 MG capsule  Take  by mouth.             Zinc 25 MG Tab  Take  by mouth.

## 2018-03-23 ENCOUNTER — TELEPHONE (OUTPATIENT)
Dept: VASCULAR LAB | Facility: MEDICAL CENTER | Age: 83
End: 2018-03-23

## 2018-03-23 NOTE — TELEPHONE ENCOUNTER
[facility]      for pt to call clinic and establish care.     Noman Shukla, PharmD     Addendum:  Pt states she was instructed by Cards to D/C warfarin and continue with only DAPT.  Message went to cardiology to confirm.    Noman Shukla, PharmD

## 2018-03-30 ENCOUNTER — TELEPHONE (OUTPATIENT)
Dept: VASCULAR LAB | Facility: MEDICAL CENTER | Age: 83
End: 2018-03-30

## 2018-03-30 DIAGNOSIS — I82.409 ACUTE DEEP VEIN THROMBOSIS (DVT) OF LOWER EXTREMITY, UNSPECIFIED LATERALITY, UNSPECIFIED VEIN (HCC): ICD-10-CM

## 2018-03-30 RX ORDER — WARFARIN SODIUM 2 MG/1
2 TABLET ORAL 3 TIMES DAILY
Qty: 90 TAB | Refills: 1 | OUTPATIENT
Start: 2018-03-30 | End: 2018-04-10 | Stop reason: SDUPTHER

## 2018-04-03 ENCOUNTER — TELEPHONE (OUTPATIENT)
Dept: CARDIOLOGY | Facility: MEDICAL CENTER | Age: 83
End: 2018-04-03

## 2018-04-03 NOTE — TELEPHONE ENCOUNTER
Received call from patient stating that she is unable to keep scheduled CTA 4/12/18. (Fourth time rescheduling) Patient will return call when her schedule allows for rescheduling. Gave patient my direct contact information and encouraged return call when she is ready to reschedule. Patient states understanding.

## 2018-04-06 ENCOUNTER — ANTICOAGULATION VISIT (OUTPATIENT)
Dept: VASCULAR LAB | Facility: MEDICAL CENTER | Age: 83
End: 2018-04-06
Attending: INTERNAL MEDICINE
Payer: COMMERCIAL

## 2018-04-06 DIAGNOSIS — I82.4Z9 DEEP VEIN THROMBOSIS (DVT) OF DISTAL VEIN OF LOWER EXTREMITY, UNSPECIFIED CHRONICITY, UNSPECIFIED LATERALITY (HCC): ICD-10-CM

## 2018-04-06 LAB
INR BLD: 1 (ref 0.9–1.2)
INR PPP: 1 (ref 2–3.5)

## 2018-04-06 PROCEDURE — 99202 OFFICE O/P NEW SF 15 MIN: CPT

## 2018-04-06 PROCEDURE — 99212 OFFICE O/P EST SF 10 MIN: CPT | Performed by: PHARMACIST

## 2018-04-06 PROCEDURE — 85610 PROTHROMBIN TIME: CPT

## 2018-04-06 NOTE — PROGRESS NOTES
"Anticoagulation Summary  As of 4/6/2018    INR goal:   2.0-3.0   TTR:   --   Today's INR:      Next INR check:      Target end date:   Indefinite    Indications    DVT (deep venous thrombosis) (CMS-[facility]) [I82.409]             Anticoagulation Episode Summary     INR check location:       Preferred lab:       Send INR reminders to:       Comments:         Anticoagulation Care Providers     Provider Role Specialty Phone number    Napoleon Pepe M.D. Referring Cardiology [phone]    [facility] Responsible  [phone]        Anticoagulation Patient Findings    Pt is new to warfarin and new to [facility].  Discussed indication for warfarin therapy and INR goal range. Explained our services, hours of operation, warfarin therapy, potential SE, potential DI.. Discussed diet at length, with an emphasis on foods rich in vitamin K.  Discussed monitoring parameters, such as blood in urine, blood in stool, discussed what to do if a dose is missed, or suspected as missed.  Emphasized importance of compliance including follow up. Discussed lifestyle choices of ETOH & smoking and its impact on therapy.    Patient with a Hx of DVT in 12/2017, she was on warfarin for a while after that, does not specifically remember how long but was then instructed to stop by \"cardiology\". She saw Dr Pepe 03/20/2018 and underwent placement of 4 stents in the left anterior descending artery and left circumflex artery. She then received message from cardiology to begin warfarin for hx of DVT. Per referring provider her warfarin duration is indefinite.    Pt is pending TAVR    Patient also on clopidogrel and ASA for stent placement. Per cardiology she is to stop ASA after one month of warfarin (stop on 4/29/18).      Pt denies any unusual s/s of bleeding, bruising, clotting or any changes to diet or medications.    She has been taking 6 mg (2 mg X 3) daily for 6 days now    Patients INR was subtherapeutic today at 1.0 .Patient " denied any signs/symptoms of bleeding or bruising. Patient denied any recent changes to medications or diet.     Patient was instructed to Take 10 mg X 4 days, Return for another INR 04/10/2018 at 11AM    [facility], she will be reaching out to her PCP to see if she can be transitioned back to [facility]. She lives in [place] therefore transportation will be an issue, as for today she does not mind returning to clinic for another INR, I also handed patient a standing lab order for INR      Alejandro Mcnair, Pharm.D  Cc Dr Bloch  Added [facility] to care team

## 2018-04-07 ENCOUNTER — TELEPHONE (OUTPATIENT)
Dept: VASCULAR LAB | Facility: MEDICAL CENTER | Age: 83
End: 2018-04-07

## 2018-04-08 NOTE — TELEPHONE ENCOUNTER
Initial anticoag note and most recent cardiology notes reviewed.  Patient with h/o DVT in dec 2017 - circumstances unclear, at least to me.  Has CAD s/p recent multi-vessel stent placement.   Also with severe AS pending possible TAVR.    Cardiology has recommended one month of asa and indefinite warfarin and clopidogrel.  We will defer to that recommendation.      Will defer all other cv care, aside from anticoagulation, to cardiology.    Michael Bloch, MD  Anticoagulation Clinic    Cc:      OTTO Pepe

## 2018-04-10 ENCOUNTER — ANTICOAGULATION VISIT (OUTPATIENT)
Dept: VASCULAR LAB | Facility: MEDICAL CENTER | Age: 83
End: 2018-04-10
Attending: INTERNAL MEDICINE
Payer: COMMERCIAL

## 2018-04-10 ENCOUNTER — TELEPHONE (OUTPATIENT)
Dept: CARDIOLOGY | Facility: MEDICAL CENTER | Age: 83
End: 2018-04-10

## 2018-04-10 DIAGNOSIS — I82.4Z9 DEEP VEIN THROMBOSIS (DVT) OF DISTAL VEIN OF LOWER EXTREMITY, UNSPECIFIED CHRONICITY, UNSPECIFIED LATERALITY (HCC): ICD-10-CM

## 2018-04-10 DIAGNOSIS — Z95.5 S/P CORONARY ARTERY STENT PLACEMENT: ICD-10-CM

## 2018-04-10 LAB — INR PPP: 2.8 (ref 2–3.5)

## 2018-04-10 PROCEDURE — 85610 PROTHROMBIN TIME: CPT

## 2018-04-10 PROCEDURE — 99211 OFF/OP EST MAY X REQ PHY/QHP: CPT

## 2018-04-10 RX ORDER — WARFARIN SODIUM 2 MG/1
2 TABLET ORAL 3 TIMES DAILY
Qty: 90 TAB | Refills: 3 | Status: SHIPPED | OUTPATIENT
Start: 2018-04-10 | End: 2018-04-13 | Stop reason: SDUPTHER

## 2018-04-10 RX ORDER — CLOPIDOGREL BISULFATE 75 MG/1
75 TABLET ORAL DAILY
Qty: 30 TAB | Refills: 11 | Status: SHIPPED
Start: 2018-04-10

## 2018-04-10 NOTE — TELEPHONE ENCOUNTER
Pt walked in asking [facility]. Also, she had several questions about her TAVR work-up and appts.     Plavix prescription printed and [facility] to the [facility]. Answered questions as able about appts. Pt still needs CTA. Notified pt that message would be left for Elisha MCCLENDON to contact her. Pt also has her number to contact Elisha as well.

## 2018-04-10 NOTE — PROGRESS NOTES
OP Anticoagulation Service Note    Date: 4/10/2018  There were no vitals filed for this visit.    Anticoagulation Summary  As of 4/10/2018    INR goal:   2.0-3.0   TTR:   --   Today's INR:   2.8   Maintenance plan:   6 mg (2 mg x 3) every day   Weekly total:   42 mg   Plan last modified:   Alejandro Mcnair, PharmD (4/6/2018)   Next INR check:   4/16/2018   Target end date:   Indefinite    Indications    DVT (deep venous thrombosis) (CMS-[facility]) [I82.409]             Anticoagulation Episode Summary     INR check location:       Preferred lab:       Send INR reminders to:       Comments:         Anticoagulation Care Providers     Provider Role Specialty Phone number    Napoleon Pepe M.D. Referring Cardiology [phone]    [facility] Responsible  [phone]        Anticoagulation Patient Findings      HPI:   Verónica Krishnan seen in clinic today, they are here today for a INR check on anticoagulation therapy with warfarin because they have a new DVT 12/2017    The reason for today's visit is to prevent morbidity and mortality from a blood clot  and to reduce the risk of bleeding while on a anticoagulant.     Reason for today's visit (per our collaborative practice policy) is because their last INR was 1.0  on  4/6/2018.   Intervention at the last visit:  Increased the dose     Additional education provided today regarding reducing bleed risk and dietary constraints:  About bleed risk and warfarin     Any upcoming procedures:   none    Confirmed warfarin dosing regimen  Interval Changes with foods rich in vitamin K: No  Interval Changes in ETOH:   No  Interval Changes in smoking status:  No  Interval Changes in medication:  No   Cost restriction:  No    S/S of bleeding or bruising:  No  Signs/symptoms  thrombosis since the last appt:  No  Bleed risk is:  High due to asa and plavix    3 vitals included with today's appt :No  (BP, HR, weight, ht, RR)     Assessment:   INR  therapeutic.      No change in dose needed today, they will need to continue with the same dose and diet to prevent adverse events while on a anticoagulant.     They have a TTR of   which is not at target (TTR target/goal is 100%) and requires close follow up to prevent a adverse event (the lower the TTR the higher risk of clots, strokes, or bleeding).       Plan:  Continue weekly warfarin dose as noted      Follow up:  Follow up appointment in 1 week(s) via the lab      Other info:  Pt educated to contact our clinic with any changes in medications or s/s of bleeding or thrombosis    CHEST guidelines recommend frequent INR monitoring at regular intervals (a few days up to a max of 12 weeks) to ensure they are on the proper dose of warfarin and not having any complications from therapy.  INRs can dramatically change over a short time period due to diet, medications, and medical conditions.

## 2018-04-13 RX ORDER — WARFARIN SODIUM 2 MG/1
2-10 TABLET ORAL DAILY
Qty: 100 TAB | Refills: 11 | Status: SHIPPED | OUTPATIENT
Start: 2018-04-13 | End: 2018-04-13 | Stop reason: SDUPTHER

## 2018-04-13 RX ORDER — WARFARIN SODIUM 2 MG/1
2-10 TABLET ORAL DAILY
Qty: 100 TAB | Refills: 11 | Status: ON HOLD | OUTPATIENT
Start: 2018-04-13 | End: 2018-04-30

## 2018-04-16 DIAGNOSIS — I35.0 NONRHEUMATIC AORTIC VALVE STENOSIS: ICD-10-CM

## 2018-04-16 DIAGNOSIS — Z01.810 PRE-OPERATIVE CARDIOVASCULAR EXAMINATION: ICD-10-CM

## 2018-04-17 ENCOUNTER — HOSPITAL ENCOUNTER (OUTPATIENT)
Dept: LAB | Facility: MEDICAL CENTER | Age: 83
End: 2018-04-17
Attending: INTERNAL MEDICINE
Payer: COMMERCIAL

## 2018-04-17 DIAGNOSIS — I82.4Z9 DEEP VEIN THROMBOSIS (DVT) OF DISTAL VEIN OF LOWER EXTREMITY, UNSPECIFIED CHRONICITY, UNSPECIFIED LATERALITY (HCC): ICD-10-CM

## 2018-04-17 LAB
INR PPP: 5.39 (ref 0.87–1.13)
PROTHROMBIN TIME: 49.1 SEC (ref 12–14.6)

## 2018-04-17 PROCEDURE — 36415 COLL VENOUS BLD VENIPUNCTURE: CPT

## 2018-04-17 PROCEDURE — 85610 PROTHROMBIN TIME: CPT

## 2018-04-18 ENCOUNTER — TELEPHONE (OUTPATIENT)
Dept: CARDIOLOGY | Facility: MEDICAL CENTER | Age: 83
End: 2018-04-18

## 2018-04-18 ENCOUNTER — ANTICOAGULATION MONITORING (OUTPATIENT)
Dept: VASCULAR LAB | Facility: MEDICAL CENTER | Age: 83
End: 2018-04-18

## 2018-04-18 DIAGNOSIS — I82.401 ACUTE DEEP VEIN THROMBOSIS (DVT) OF RIGHT LOWER EXTREMITY, UNSPECIFIED VEIN (HCC): ICD-10-CM

## 2018-04-18 NOTE — PROGRESS NOTES
Anticoagulation Summary  As of 4/18/2018    INR goal:   2.0-3.0   TTR:   0.0 % (1 d)   Today's INR:   5.39! (4/17/2018)   Maintenance plan:   6 mg (2 mg x 3) every day   Weekly total:   42 mg   Plan last modified:   Randy SalgadoD (4/6/2018)   Next INR check:   4/24/2018   Target end date:   Indefinite    Indications    DVT (deep venous thrombosis) (CMS-[facility]) [I82.409]             Anticoagulation Episode Summary     INR check location:       Preferred lab:       Send INR reminders to:       Comments:         Anticoagulation Care Providers     Provider Role Specialty Phone number    Napoleon Pepe M.D. Referring Cardiology [phone]    [facility] Responsible  [phone]        Anticoagulation Patient Findings    Spoke with Verónica to report a supra therapeutic INR of 5.4.  Will HOLD warfarin for 2 days, then resume current dosing regimen. Pt denies any unusual s/s of bleeding, bruising, clotting or any changes to diet or medications.  Follow up in 1 weeks, to reduce risk of adverse events related to this high risk medication,  Warfarin.    Melissa Cook, PharmD

## 2018-04-18 NOTE — TELEPHONE ENCOUNTER
Spoke with patient confirming for CTA tomorrow. Patient states that she spoke with radiology tech who asked if she could come a little early incase she is able to get the study done sooner, therefore the patient will actually be arriving around [phone] for CTA. Patient also reports that the anticoagulation clinic had her stop her warfarin yesterday for 3 days has her INR was 5+. Patient wanted to assure Dr. Pepe was informed of such. Patient states thankful for call and will agree to attend CTA tomorrow.

## 2018-04-19 ENCOUNTER — HOSPITAL ENCOUNTER (OUTPATIENT)
Dept: RADIOLOGY | Facility: MEDICAL CENTER | Age: 83
End: 2018-04-19
Attending: INTERNAL MEDICINE
Payer: COMMERCIAL

## 2018-04-19 DIAGNOSIS — Z01.810 PRE-OPERATIVE CARDIOVASCULAR EXAMINATION: ICD-10-CM

## 2018-04-19 DIAGNOSIS — I35.0 NONRHEUMATIC AORTIC VALVE STENOSIS: ICD-10-CM

## 2018-04-19 PROCEDURE — 71275 CT ANGIOGRAPHY CHEST: CPT

## 2018-04-19 PROCEDURE — 700117 HCHG RX CONTRAST REV CODE 255: Performed by: INTERNAL MEDICINE

## 2018-04-19 PROCEDURE — 74174 CTA ABD&PLVS W/CONTRAST: CPT

## 2018-04-19 RX ADMIN — IOHEXOL 100 ML: 350 INJECTION, SOLUTION INTRAVENOUS at 15:43

## 2018-04-24 ENCOUNTER — HOSPITAL ENCOUNTER (OUTPATIENT)
Dept: LAB | Facility: MEDICAL CENTER | Age: 83
End: 2018-04-24
Attending: INTERNAL MEDICINE
Payer: COMMERCIAL

## 2018-04-24 DIAGNOSIS — Z00.6 EXAMINATION OF PARTICIPANT IN CLINICAL TRIAL: ICD-10-CM

## 2018-04-24 DIAGNOSIS — I35.0 NONRHEUMATIC AORTIC VALVE STENOSIS: ICD-10-CM

## 2018-04-24 LAB
INR PPP: 1.69 (ref 0.87–1.13)
PROTHROMBIN TIME: 19.6 SEC (ref 12–14.6)

## 2018-04-24 PROCEDURE — 36415 COLL VENOUS BLD VENIPUNCTURE: CPT

## 2018-04-24 PROCEDURE — 85610 PROTHROMBIN TIME: CPT

## 2018-04-25 ENCOUNTER — ANTICOAGULATION MONITORING (OUTPATIENT)
Dept: VASCULAR LAB | Facility: MEDICAL CENTER | Age: 83
End: 2018-04-25

## 2018-04-25 DIAGNOSIS — I82.409 DEEP VEIN THROMBOSIS (DVT) OF LOWER EXTREMITY, UNSPECIFIED CHRONICITY, UNSPECIFIED LATERALITY, UNSPECIFIED VEIN (HCC): ICD-10-CM

## 2018-04-25 NOTE — PROGRESS NOTES
Anticoagulation Summary  As of 4/25/2018    INR goal:   2.0-3.0   TTR:   22.5 % (1.1 wk)   Today's INR:   1.69! (4/24/2018)   Warfarin maintenance plan:   6 mg (2 mg x 3) every day   Weekly warfarin total:   42 mg   Plan last modified:   John Green, PharmD (4/25/2018)   Next INR check:   4/27/2018   Target end date:   Indefinite    Indications    DVT (deep venous thrombosis) (CMS-[facility]) [I82.409]             Anticoagulation Episode Summary     INR check location:       Preferred lab:       Send INR reminders to:       Comments:         Anticoagulation Care Providers     Provider Role Specialty Phone number    Napoleon Pepe M.D. Referring Cardiology [phone]    [facility] Responsible  [phone]        Anticoagulation Patient Findings  Patient Findings     Negatives:   Signs/symptoms of thrombosis, Signs/symptoms of bleeding, Laboratory test error suspected, Change in health, Change in alcohol use, Change in activity, Upcoming invasive procedure, Emergency department visit, Upcoming dental procedure, Missed doses, Extra doses, Change in medications, Change in diet/appetite, Hospital admission, Bruising, Other complaints        Spoke with patient today regarding subtherapeutic INR of 1.69.  Patient denies any signs/symptoms of bruising or bleeding or any changes in diet and medications.  Instructed patient to call clinic with any questions or concerns.  Patient states she was taking 10mg daily leading up to INR of 5.39 last week.    Will have her resume 6mg dose daily until next INR.    She prefers to visit clinic going forward.  Booked appt for [place].  Follow up in 3 days, to reduce risk of adverse events related to this high risk medication,  Warfarin.    John Green, PharmD

## 2018-04-27 ENCOUNTER — ANTICOAGULATION VISIT (OUTPATIENT)
Dept: MEDICAL GROUP | Facility: PHYSICIAN GROUP | Age: 83
End: 2018-04-27
Payer: MEDICARE

## 2018-04-27 DIAGNOSIS — Z79.01 CHRONIC ANTICOAGULATION: Primary | ICD-10-CM

## 2018-04-27 LAB — INR PPP: 1.5 (ref 2–3.5)

## 2018-04-27 PROCEDURE — 99211 OFF/OP EST MAY X REQ PHY/QHP: CPT | Performed by: NURSE PRACTITIONER

## 2018-04-27 PROCEDURE — 85610 PROTHROMBIN TIME: CPT | Performed by: NURSE PRACTITIONER

## 2018-04-27 NOTE — PROGRESS NOTES
Anticoagulation Summary  As of 4/27/2018    INR goal:   2.0-3.0   TTR:   --   Today's INR:   1.5!   Warfarin maintenance plan:   6 mg (2 mg x 3) every day   Weekly warfarin total:   42 mg   Plan last modified:   John Green, PharmD (4/25/2018)   Next INR check:   5/4/2018   Target end date:   Indefinite    Indications    DVT (deep venous thrombosis) (CMS-[facility]) [I82.409]             Anticoagulation Episode Summary     INR check location:       Preferred lab:       Send INR reminders to:       Comments:         Anticoagulation Care Providers     Provider Role Specialty Phone number    Napoleon Pepe M.D. Referring Cardiology [phone]    [facility] Responsible  [phone]        Anticoagulation Patient Findings      HPI:  Verónica Morrisseyfred Krishnan seen in clinic today, on anticoagulation therapy with warfarin for Hx DVT in December 2017  Reason for today's visit (per our collaborative practice policy) is because their last INR was 1.69 on 4/25/18. Intervention at the last visit: Pt's warfarin dose was resumed  Changes to current medical/health status since last appt: Pt states that she got approved for valve replacement surgery for Monday and was told to be off her warfarin.   No - signs/symptoms of bleeding and/or thrombosis since the last appt.    No - interval changes to diet or any interval changes to medications since last appt.   No - complications or cost restrictions with current therapy.   BP declined    A/P   INR  is sub-therapeutic.   Possible reason(s) INR is not in range today: Pt had just resumed warfarin a few days ago.    At this point will have pt hold her warfarin starting today until after her valve replacement surgery as per operating MD. Discussed risks of being off warfarin, pt refuses lovenox, states that she got horribly bruised from it before. Can't find documentation that she was instructed to hold warfarin, however as procedure is Monday, will continue as per  patient.     Follow up appointment in 1 weeks to reduce risk of adverse events related to this high risk medication, Warfarin.    Purpose of next visit:  They are at a increased risk of clots because INR is below goal.  They are at a increased risk of stroke because INR is below goal.      Other info:  Pt educated to contact our clinic with any changes in medications or s/s of bleeding or thrombosis  CHEST guidelines recommend frequent INR monitoring at regular intervals (a few days up to a max of 12 weeks) to ensure they are on the proper dose of warfarin and not having any complications from therapy. INRs can dramatically change over a short time period due to diet, medications, and medical conditions.     Shalonda Turpin, PharmD

## 2018-04-30 ENCOUNTER — HOSPITAL ENCOUNTER (INPATIENT)
Facility: MEDICAL CENTER | Age: 83
LOS: 2 days | DRG: 266 | End: 2018-05-02
Attending: INTERNAL MEDICINE | Admitting: INTERNAL MEDICINE
Payer: COMMERCIAL

## 2018-04-30 ENCOUNTER — APPOINTMENT (OUTPATIENT)
Dept: RADIOLOGY | Facility: MEDICAL CENTER | Age: 83
DRG: 266 | End: 2018-04-30
Attending: NURSE PRACTITIONER
Payer: COMMERCIAL

## 2018-04-30 ENCOUNTER — APPOINTMENT (OUTPATIENT)
Dept: RADIOLOGY | Facility: MEDICAL CENTER | Age: 83
DRG: 266 | End: 2018-04-30
Attending: INTERNAL MEDICINE
Payer: COMMERCIAL

## 2018-04-30 DIAGNOSIS — I82.501 CHRONIC DEEP VEIN THROMBOSIS (DVT) OF RIGHT LOWER EXTREMITY, UNSPECIFIED VEIN (HCC): ICD-10-CM

## 2018-04-30 DIAGNOSIS — I50.33 ACUTE ON CHRONIC DIASTOLIC HEART FAILURE DUE TO VALVULAR DISEASE (HCC): ICD-10-CM

## 2018-04-30 DIAGNOSIS — I38 ACUTE ON CHRONIC DIASTOLIC HEART FAILURE DUE TO VALVULAR DISEASE (HCC): ICD-10-CM

## 2018-04-30 PROBLEM — I35.0 AORTIC STENOSIS: Status: RESOLVED | Noted: 2018-02-01 | Resolved: 2018-04-30

## 2018-04-30 PROBLEM — Z95.2 S/P TAVR (TRANSCATHETER AORTIC VALVE REPLACEMENT): Status: ACTIVE | Noted: 2018-04-30

## 2018-04-30 PROBLEM — I25.10 CAD (CORONARY ARTERY DISEASE): Status: ACTIVE | Noted: 2018-04-30

## 2018-04-30 PROBLEM — Z95.5 STENTED CORONARY ARTERY: Status: ACTIVE | Noted: 2018-04-30

## 2018-04-30 LAB
ABO GROUP BLD: NORMAL
ABO GROUP BLD: NORMAL
ACT BLD: 230 SEC (ref 74–137)
ALBUMIN SERPL BCP-MCNC: 3.2 G/DL (ref 3.2–4.9)
ALBUMIN SERPL BCP-MCNC: 4 G/DL (ref 3.2–4.9)
ALBUMIN/GLOB SERPL: 1.5 G/DL
ALBUMIN/GLOB SERPL: 1.5 G/DL
ALP SERPL-CCNC: 73 U/L (ref 30–99)
ALP SERPL-CCNC: 82 U/L (ref 30–99)
ALT SERPL-CCNC: 16 U/L (ref 2–50)
ALT SERPL-CCNC: 19 U/L (ref 2–50)
ANION GAP SERPL CALC-SCNC: 7 MMOL/L (ref 0–11.9)
ANION GAP SERPL CALC-SCNC: 8 MMOL/L (ref 0–11.9)
APPEARANCE UR: CLEAR
AST SERPL-CCNC: 19 U/L (ref 12–45)
AST SERPL-CCNC: 20 U/L (ref 12–45)
BACTERIA #/AREA URNS HPF: NEGATIVE /HPF
BILIRUB SERPL-MCNC: 0.6 MG/DL (ref 0.1–1.5)
BILIRUB SERPL-MCNC: 1 MG/DL (ref 0.1–1.5)
BILIRUB UR QL STRIP.AUTO: NEGATIVE
BLD GP AB SCN SERPL QL: NORMAL
BNP SERPL-MCNC: 384 PG/ML (ref 0–100)
BNP SERPL-MCNC: 557 PG/ML (ref 0–100)
BUN SERPL-MCNC: 12 MG/DL (ref 8–22)
BUN SERPL-MCNC: 13 MG/DL (ref 8–22)
CALCIUM SERPL-MCNC: 8.2 MG/DL (ref 8.5–10.5)
CALCIUM SERPL-MCNC: 9.4 MG/DL (ref 8.5–10.5)
CHLORIDE SERPL-SCNC: 103 MMOL/L (ref 96–112)
CHLORIDE SERPL-SCNC: 104 MMOL/L (ref 96–112)
CO2 SERPL-SCNC: 25 MMOL/L (ref 20–33)
CO2 SERPL-SCNC: 25 MMOL/L (ref 20–33)
COLOR UR: YELLOW
CREAT SERPL-MCNC: 0.82 MG/DL (ref 0.5–1.4)
CREAT SERPL-MCNC: 0.91 MG/DL (ref 0.5–1.4)
EKG IMPRESSION: NORMAL
EPI CELLS #/AREA URNS HPF: NEGATIVE /HPF
ERYTHROCYTE [DISTWIDTH] IN BLOOD BY AUTOMATED COUNT: 43.2 FL (ref 35.9–50)
ERYTHROCYTE [DISTWIDTH] IN BLOOD BY AUTOMATED COUNT: 43.3 FL (ref 35.9–50)
GLOBULIN SER CALC-MCNC: 2.1 G/DL (ref 1.9–3.5)
GLOBULIN SER CALC-MCNC: 2.6 G/DL (ref 1.9–3.5)
GLUCOSE SERPL-MCNC: 109 MG/DL (ref 65–99)
GLUCOSE SERPL-MCNC: 119 MG/DL (ref 65–99)
GLUCOSE UR STRIP.AUTO-MCNC: NEGATIVE MG/DL
HCT VFR BLD AUTO: 32.8 % (ref 37–47)
HCT VFR BLD AUTO: 38.6 % (ref 37–47)
HGB BLD-MCNC: 10.6 G/DL (ref 12–16)
HGB BLD-MCNC: 12.2 G/DL (ref 12–16)
HYALINE CASTS #/AREA URNS LPF: ABNORMAL /LPF
INR PPP: 1.19 (ref 0.87–1.13)
KETONES UR STRIP.AUTO-MCNC: NEGATIVE MG/DL
LEUKOCYTE ESTERASE UR QL STRIP.AUTO: ABNORMAL
MCH RBC QN AUTO: 27.4 PG (ref 27–33)
MCH RBC QN AUTO: 27.9 PG (ref 27–33)
MCHC RBC AUTO-ENTMCNC: 31.6 G/DL (ref 33.6–35)
MCHC RBC AUTO-ENTMCNC: 32.3 G/DL (ref 33.6–35)
MCV RBC AUTO: 86.3 FL (ref 81.4–97.8)
MCV RBC AUTO: 86.7 FL (ref 81.4–97.8)
MICRO URNS: ABNORMAL
NITRITE UR QL STRIP.AUTO: NEGATIVE
PH UR STRIP.AUTO: 5.5 [PH]
PLATELET # BLD AUTO: 112 K/UL (ref 164–446)
PLATELET # BLD AUTO: 149 K/UL (ref 164–446)
PMV BLD AUTO: 12.1 FL (ref 9–12.9)
PMV BLD AUTO: 12.8 FL (ref 9–12.9)
POTASSIUM SERPL-SCNC: 3.6 MMOL/L (ref 3.6–5.5)
POTASSIUM SERPL-SCNC: 3.7 MMOL/L (ref 3.6–5.5)
PROT SERPL-MCNC: 5.3 G/DL (ref 6–8.2)
PROT SERPL-MCNC: 6.6 G/DL (ref 6–8.2)
PROT UR QL STRIP: NEGATIVE MG/DL
PROTHROMBIN TIME: 14.8 SEC (ref 12–14.6)
RBC # BLD AUTO: 3.8 M/UL (ref 4.2–5.4)
RBC # BLD AUTO: 4.45 M/UL (ref 4.2–5.4)
RBC # URNS HPF: ABNORMAL /HPF
RBC UR QL AUTO: NEGATIVE
RH BLD: NORMAL
RH BLD: NORMAL
SODIUM SERPL-SCNC: 136 MMOL/L (ref 135–145)
SODIUM SERPL-SCNC: 136 MMOL/L (ref 135–145)
SP GR UR STRIP.AUTO: 1.01
UROBILINOGEN UR STRIP.AUTO-MCNC: 0.2 MG/DL
WBC # BLD AUTO: 3.8 K/UL (ref 4.8–10.8)
WBC # BLD AUTO: 4.6 K/UL (ref 4.8–10.8)
WBC #/AREA URNS HPF: ABNORMAL /HPF

## 2018-04-30 PROCEDURE — 700102 HCHG RX REV CODE 250 W/ 637 OVERRIDE(OP): Performed by: NURSE PRACTITIONER

## 2018-04-30 PROCEDURE — 160036 HCHG PACU - EA ADDL 30 MINS PHASE I: Performed by: INTERNAL MEDICINE

## 2018-04-30 PROCEDURE — B24BZZ4 ULTRASONOGRAPHY OF HEART WITH AORTA, TRANSESOPHAGEAL: ICD-10-PCS | Performed by: THORACIC SURGERY (CARDIOTHORACIC VASCULAR SURGERY)

## 2018-04-30 PROCEDURE — 93005 ELECTROCARDIOGRAM TRACING: CPT | Performed by: NURSE PRACTITIONER

## 2018-04-30 PROCEDURE — C1883 ADAPT/EXT, PACING/NEURO LEAD: HCPCS | Performed by: INTERNAL MEDICINE

## 2018-04-30 PROCEDURE — 71045 X-RAY EXAM CHEST 1 VIEW: CPT

## 2018-04-30 PROCEDURE — 503000 HCHG SUTURE, OHS: Performed by: INTERNAL MEDICINE

## 2018-04-30 PROCEDURE — A6402 STERILE GAUZE <= 16 SQ IN: HCPCS | Performed by: INTERNAL MEDICINE

## 2018-04-30 PROCEDURE — 85347 COAGULATION TIME ACTIVATED: CPT

## 2018-04-30 PROCEDURE — 110372 HCHG SHELL REV 278: Performed by: INTERNAL MEDICINE

## 2018-04-30 PROCEDURE — 500002 HCHG ADHESIVE, DERMABOND: Performed by: INTERNAL MEDICINE

## 2018-04-30 PROCEDURE — 700101 HCHG RX REV CODE 250

## 2018-04-30 PROCEDURE — 86850 RBC ANTIBODY SCREEN: CPT

## 2018-04-30 PROCEDURE — 700111 HCHG RX REV CODE 636 W/ 250 OVERRIDE (IP): Performed by: NURSE PRACTITIONER

## 2018-04-30 PROCEDURE — 700111 HCHG RX REV CODE 636 W/ 250 OVERRIDE (IP)

## 2018-04-30 PROCEDURE — 93321 DOPPLER ECHO F-UP/LMTD STD: CPT

## 2018-04-30 PROCEDURE — 502240 HCHG MISC OR SUPPLY RC 0272: Performed by: INTERNAL MEDICINE

## 2018-04-30 PROCEDURE — 160042 HCHG SURGERY MINUTES - EA ADDL 1 MIN LEVEL 5: Performed by: INTERNAL MEDICINE

## 2018-04-30 PROCEDURE — C1769 GUIDE WIRE: HCPCS | Performed by: INTERNAL MEDICINE

## 2018-04-30 PROCEDURE — 93010 ELECTROCARDIOGRAM REPORT: CPT | Mod: 77 | Performed by: INTERNAL MEDICINE

## 2018-04-30 PROCEDURE — 160048 HCHG OR STATISTICAL LEVEL 1-5: Performed by: INTERNAL MEDICINE

## 2018-04-30 PROCEDURE — 160002 HCHG RECOVERY MINUTES (STAT): Performed by: INTERNAL MEDICINE

## 2018-04-30 PROCEDURE — 80053 COMPREHEN METABOLIC PANEL: CPT

## 2018-04-30 PROCEDURE — C1894 INTRO/SHEATH, NON-LASER: HCPCS | Performed by: INTERNAL MEDICINE

## 2018-04-30 PROCEDURE — 85610 PROTHROMBIN TIME: CPT

## 2018-04-30 PROCEDURE — 160031 HCHG SURGERY MINUTES - 1ST 30 MINS LEVEL 5: Performed by: INTERNAL MEDICINE

## 2018-04-30 PROCEDURE — C1725 CATH, TRANSLUMIN NON-LASER: HCPCS | Performed by: INTERNAL MEDICINE

## 2018-04-30 PROCEDURE — 770020 HCHG ROOM/CARE - TELE (206)

## 2018-04-30 PROCEDURE — 85027 COMPLETE CBC AUTOMATED: CPT

## 2018-04-30 PROCEDURE — 93010 ELECTROCARDIOGRAM REPORT: CPT | Performed by: INTERNAL MEDICINE

## 2018-04-30 PROCEDURE — 83880 ASSAY OF NATRIURETIC PEPTIDE: CPT

## 2018-04-30 PROCEDURE — C9248 INJ, CLEVIDIPINE BUTYRATE: HCPCS

## 2018-04-30 PROCEDURE — A9270 NON-COVERED ITEM OR SERVICE: HCPCS | Performed by: NURSE PRACTITIONER

## 2018-04-30 PROCEDURE — 81001 URINALYSIS AUTO W/SCOPE: CPT

## 2018-04-30 PROCEDURE — 86901 BLOOD TYPING SEROLOGIC RH(D): CPT

## 2018-04-30 PROCEDURE — A9270 NON-COVERED ITEM OR SERVICE: HCPCS

## 2018-04-30 PROCEDURE — 93308 TTE F-UP OR LMTD: CPT

## 2018-04-30 PROCEDURE — 160035 HCHG PACU - 1ST 60 MINS PHASE I: Performed by: INTERNAL MEDICINE

## 2018-04-30 PROCEDURE — 86900 BLOOD TYPING SEROLOGIC ABO: CPT

## 2018-04-30 PROCEDURE — C1760 CLOSURE DEV, VASC: HCPCS | Performed by: INTERNAL MEDICINE

## 2018-04-30 PROCEDURE — 700105 HCHG RX REV CODE 258: Performed by: NURSE PRACTITIONER

## 2018-04-30 PROCEDURE — 160009 HCHG ANES TIME/MIN: Performed by: INTERNAL MEDICINE

## 2018-04-30 PROCEDURE — 02RF38Z REPLACEMENT OF AORTIC VALVE WITH ZOOPLASTIC TISSUE, PERCUTANEOUS APPROACH: ICD-10-PCS | Performed by: INTERNAL MEDICINE

## 2018-04-30 PROCEDURE — 93325 DOPPLER ECHO COLOR FLOW MAPG: CPT

## 2018-04-30 PROCEDURE — 503001 HCHG PERFUSION: Performed by: INTERNAL MEDICINE

## 2018-04-30 PROCEDURE — 93005 ELECTROCARDIOGRAM TRACING: CPT | Performed by: INTERNAL MEDICINE

## 2018-04-30 PROCEDURE — 700102 HCHG RX REV CODE 250 W/ 637 OVERRIDE(OP)

## 2018-04-30 DEVICE — KIT TRANSCATHETER HEART VALVE SAPIEN-3 26MM: Type: IMPLANTABLE DEVICE | Site: HEART | Status: FUNCTIONAL

## 2018-04-30 DEVICE — DEVICE CLSR 6FR HMST IMPL SLF STS PLUS ANGIOSEAL (10EA/CA): Type: IMPLANTABLE DEVICE | Site: GROIN | Status: FUNCTIONAL

## 2018-04-30 RX ORDER — FUROSEMIDE 10 MG/ML
40 INJECTION INTRAMUSCULAR; INTRAVENOUS
Status: DISCONTINUED | OUTPATIENT
Start: 2018-04-30 | End: 2018-05-02 | Stop reason: HOSPADM

## 2018-04-30 RX ORDER — MEPERIDINE HYDROCHLORIDE 50 MG/ML
INJECTION INTRAMUSCULAR; INTRAVENOUS; SUBCUTANEOUS
Status: COMPLETED
Start: 2018-04-30 | End: 2018-04-30

## 2018-04-30 RX ORDER — METOPROLOL TARTRATE 50 MG/1
50 TABLET, FILM COATED ORAL 2 TIMES DAILY
Status: DISCONTINUED | OUTPATIENT
Start: 2018-04-30 | End: 2018-05-02 | Stop reason: HOSPADM

## 2018-04-30 RX ORDER — POTASSIUM CHLORIDE 1.5 G/1.58G
20 POWDER, FOR SOLUTION ORAL DAILY
Status: DISCONTINUED | OUTPATIENT
Start: 2018-04-30 | End: 2018-04-30

## 2018-04-30 RX ORDER — WARFARIN SODIUM 6 MG/1
6 TABLET ORAL DAILY
Status: DISCONTINUED | OUTPATIENT
Start: 2018-04-30 | End: 2018-04-30

## 2018-04-30 RX ORDER — LIDOCAINE HYDROCHLORIDE 20 MG/ML
INJECTION, SOLUTION INFILTRATION; PERINEURAL
Status: DISCONTINUED | OUTPATIENT
Start: 2018-04-30 | End: 2018-04-30 | Stop reason: HOSPADM

## 2018-04-30 RX ORDER — L.ACIDOPH,PLANT/B.ANIMAL,LONG 2B CELL
1 CAPSULE ORAL DAILY
Status: DISCONTINUED | OUTPATIENT
Start: 2018-04-30 | End: 2018-04-30

## 2018-04-30 RX ORDER — CALCIUM CARBONATE 500(1250)
600 TABLET ORAL DAILY
Status: DISCONTINUED | OUTPATIENT
Start: 2018-04-30 | End: 2018-05-02 | Stop reason: HOSPADM

## 2018-04-30 RX ORDER — POLYETHYLENE GLYCOL 3350 17 G/17G
1 POWDER, FOR SOLUTION ORAL DAILY
Status: DISCONTINUED | OUTPATIENT
Start: 2018-05-01 | End: 2018-05-02 | Stop reason: HOSPADM

## 2018-04-30 RX ORDER — LEVOTHYROXINE SODIUM 112 UG/1
112 TABLET ORAL
Status: DISCONTINUED | OUTPATIENT
Start: 2018-05-01 | End: 2018-05-02 | Stop reason: HOSPADM

## 2018-04-30 RX ORDER — ONDANSETRON 2 MG/ML
4 INJECTION INTRAMUSCULAR; INTRAVENOUS EVERY 4 HOURS PRN
Status: DISCONTINUED | OUTPATIENT
Start: 2018-04-30 | End: 2018-05-02 | Stop reason: HOSPADM

## 2018-04-30 RX ORDER — BUPIVACAINE HYDROCHLORIDE 2.5 MG/ML
INJECTION, SOLUTION EPIDURAL; INFILTRATION; INTRACAUDAL
Status: DISCONTINUED | OUTPATIENT
Start: 2018-04-30 | End: 2018-04-30 | Stop reason: HOSPADM

## 2018-04-30 RX ORDER — FUROSEMIDE 40 MG/1
40 TABLET ORAL DAILY
Status: DISCONTINUED | OUTPATIENT
Start: 2018-04-30 | End: 2018-04-30

## 2018-04-30 RX ORDER — WARFARIN SODIUM 2 MG/1
6 TABLET ORAL DAILY
Status: ON HOLD | COMMUNITY
End: 2018-05-02

## 2018-04-30 RX ORDER — HYDRALAZINE HYDROCHLORIDE 20 MG/ML
INJECTION INTRAMUSCULAR; INTRAVENOUS
Status: COMPLETED
Start: 2018-04-30 | End: 2018-04-30

## 2018-04-30 RX ORDER — WARFARIN SODIUM 6 MG/1
6 TABLET ORAL
Status: DISCONTINUED | OUTPATIENT
Start: 2018-04-30 | End: 2018-05-02 | Stop reason: HOSPADM

## 2018-04-30 RX ORDER — ONDANSETRON 2 MG/ML
INJECTION INTRAMUSCULAR; INTRAVENOUS
Status: DISPENSED
Start: 2018-04-30 | End: 2018-04-30

## 2018-04-30 RX ORDER — LIDOCAINE HYDROCHLORIDE 10 MG/ML
INJECTION, SOLUTION INFILTRATION; PERINEURAL
Status: COMPLETED
Start: 2018-04-30 | End: 2018-04-30

## 2018-04-30 RX ORDER — ZINC 25 MG
1 TABLET ORAL DAILY
Status: DISCONTINUED | OUTPATIENT
Start: 2018-04-30 | End: 2018-04-30

## 2018-04-30 RX ORDER — DIPHENHYDRAMINE HCL 25 MG
25 TABLET ORAL NIGHTLY PRN
Status: DISCONTINUED | OUTPATIENT
Start: 2018-04-30 | End: 2018-05-02 | Stop reason: HOSPADM

## 2018-04-30 RX ORDER — ASCORBIC ACID 500 MG
500 TABLET ORAL DAILY
Status: DISCONTINUED | OUTPATIENT
Start: 2018-04-30 | End: 2018-05-02 | Stop reason: HOSPADM

## 2018-04-30 RX ORDER — HYDRALAZINE HYDROCHLORIDE 20 MG/ML
10 INJECTION INTRAMUSCULAR; INTRAVENOUS
Status: DISCONTINUED | OUTPATIENT
Start: 2018-04-30 | End: 2018-05-02 | Stop reason: HOSPADM

## 2018-04-30 RX ORDER — ANASTROZOLE 1 MG/1
1 TABLET ORAL DAILY
Status: DISCONTINUED | OUTPATIENT
Start: 2018-04-30 | End: 2018-05-02 | Stop reason: HOSPADM

## 2018-04-30 RX ORDER — CLOPIDOGREL BISULFATE 75 MG/1
75 TABLET ORAL DAILY
Status: DISCONTINUED | OUTPATIENT
Start: 2018-04-30 | End: 2018-05-02 | Stop reason: HOSPADM

## 2018-04-30 RX ORDER — PERPHENAZINE/AMITRIPTYLINE HCL 4 MG-25 MG
40 TABLET ORAL DAILY
Status: DISCONTINUED | OUTPATIENT
Start: 2018-04-30 | End: 2018-04-30

## 2018-04-30 RX ORDER — DIPHENHYDRAMINE HYDROCHLORIDE 50 MG/ML
25 INJECTION INTRAMUSCULAR; INTRAVENOUS EVERY 6 HOURS PRN
Status: DISCONTINUED | OUTPATIENT
Start: 2018-04-30 | End: 2018-05-02 | Stop reason: HOSPADM

## 2018-04-30 RX ORDER — SODIUM CHLORIDE 9 MG/ML
INJECTION, SOLUTION INTRAVENOUS
Status: ACTIVE
Start: 2018-04-30 | End: 2018-05-01

## 2018-04-30 RX ORDER — SODIUM CHLORIDE 9 MG/ML
INJECTION, SOLUTION INTRAVENOUS CONTINUOUS
Status: DISCONTINUED | OUTPATIENT
Start: 2018-04-30 | End: 2018-04-30

## 2018-04-30 RX ORDER — ACETAMINOPHEN 325 MG/1
650 TABLET ORAL EVERY 6 HOURS PRN
Status: DISCONTINUED | OUTPATIENT
Start: 2018-04-30 | End: 2018-05-02 | Stop reason: HOSPADM

## 2018-04-30 RX ORDER — POTASSIUM CHLORIDE 20 MEQ/1
20 TABLET, EXTENDED RELEASE ORAL DAILY
Status: DISCONTINUED | OUTPATIENT
Start: 2018-04-30 | End: 2018-05-02 | Stop reason: HOSPADM

## 2018-04-30 RX ORDER — ATORVASTATIN CALCIUM 40 MG/1
40 TABLET, FILM COATED ORAL NIGHTLY
Status: DISCONTINUED | OUTPATIENT
Start: 2018-04-30 | End: 2018-05-02 | Stop reason: HOSPADM

## 2018-04-30 RX ORDER — FUROSEMIDE 40 MG/1
40 TABLET ORAL DAILY
Status: ON HOLD | COMMUNITY
End: 2018-05-02

## 2018-04-30 RX ORDER — CHOLECALCIFEROL (VITAMIN D3) 125 MCG
500 CAPSULE ORAL DAILY
Status: DISCONTINUED | OUTPATIENT
Start: 2018-04-30 | End: 2018-05-02 | Stop reason: HOSPADM

## 2018-04-30 RX ORDER — LIDOCAINE HYDROCHLORIDE 10 MG/ML
0.5 INJECTION, SOLUTION INFILTRATION; PERINEURAL
Status: ACTIVE | OUTPATIENT
Start: 2018-04-30 | End: 2018-05-01

## 2018-04-30 RX ORDER — ALENDRONATE SODIUM 10 MG/1
35 TABLET ORAL
Status: DISCONTINUED | OUTPATIENT
Start: 2018-05-06 | End: 2018-05-02 | Stop reason: HOSPADM

## 2018-04-30 RX ORDER — OXYBUTYNIN CHLORIDE 5 MG/1
5 TABLET ORAL 2 TIMES DAILY
Status: DISCONTINUED | OUTPATIENT
Start: 2018-04-30 | End: 2018-05-02 | Stop reason: HOSPADM

## 2018-04-30 RX ADMIN — FUROSEMIDE 40 MG: 10 INJECTION, SOLUTION INTRAMUSCULAR; INTRAVENOUS at 14:45

## 2018-04-30 RX ADMIN — METOPROLOL TARTRATE 50 MG: 50 TABLET, FILM COATED ORAL at 15:33

## 2018-04-30 RX ADMIN — ACETAMINOPHEN 650 MG: 325 TABLET, FILM COATED ORAL at 23:49

## 2018-04-30 RX ADMIN — CLOPIDOGREL 75 MG: 75 TABLET, FILM COATED ORAL at 14:45

## 2018-04-30 RX ADMIN — LIDOCAINE HYDROCHLORIDE 0.5 ML: 10 INJECTION, SOLUTION INFILTRATION; PERINEURAL at 07:23

## 2018-04-30 RX ADMIN — HYDROCODONE BITARTRATE AND ACETAMINOPHEN 15 ML: 2.5; 108 SOLUTION ORAL at 11:32

## 2018-04-30 RX ADMIN — POTASSIUM CHLORIDE 20 MEQ: 1500 TABLET, EXTENDED RELEASE ORAL at 15:35

## 2018-04-30 RX ADMIN — ANASTROZOLE 1 MG: 1 TABLET, FILM COATED ORAL at 15:33

## 2018-04-30 RX ADMIN — MAGNESIUM CITRATE 148 ML: 1.75 LIQUID ORAL at 14:00

## 2018-04-30 RX ADMIN — OXYCODONE HYDROCHLORIDE AND ACETAMINOPHEN 500 MG: 500 TABLET ORAL at 14:45

## 2018-04-30 RX ADMIN — Medication 500 MCG: at 14:45

## 2018-04-30 RX ADMIN — ONDANSETRON HYDROCHLORIDE 4 MG: 2 INJECTION, SOLUTION INTRAMUSCULAR; INTRAVENOUS at 15:49

## 2018-04-30 RX ADMIN — MEPERIDINE HYDROCHLORIDE 12.5 MG: 50 INJECTION INTRAMUSCULAR; INTRAVENOUS; SUBCUTANEOUS at 10:59

## 2018-04-30 RX ADMIN — Medication 500 MG: at 14:45

## 2018-04-30 RX ADMIN — HYDRALAZINE HYDROCHLORIDE 5 MG: 20 INJECTION INTRAMUSCULAR; INTRAVENOUS at 11:02

## 2018-04-30 RX ADMIN — WARFARIN SODIUM 6 MG: 6 TABLET ORAL at 18:00

## 2018-04-30 RX ADMIN — ATORVASTATIN CALCIUM 40 MG: 40 TABLET, FILM COATED ORAL at 18:00

## 2018-04-30 RX ADMIN — SODIUM CHLORIDE: 9 INJECTION, SOLUTION INTRAVENOUS at 13:08

## 2018-04-30 RX ADMIN — ONDANSETRON HYDROCHLORIDE 4 MG: 2 INJECTION, SOLUTION INTRAMUSCULAR; INTRAVENOUS at 19:47

## 2018-04-30 RX ADMIN — OXYBUTYNIN CHLORIDE 5 MG: 5 TABLET ORAL at 14:45

## 2018-04-30 RX ADMIN — CHOLECALCIFEROL TAB 10 MCG (400 UNIT) 400 UNITS: 10 TAB at 18:00

## 2018-04-30 ASSESSMENT — COPD QUESTIONNAIRES
COPD SCREENING SCORE: 7
COPD SCREENING SCORE: 2
DO YOU EVER COUGH UP ANY MUCUS OR PHLEGM?: YES, EVERY DAY
DURING THE PAST 4 WEEKS HOW MUCH DID YOU FEEL SHORT OF BREATH: SOME OF THE TIME
IN THE PAST 12 MONTHS DO YOU DO LESS THAN YOU USED TO BECAUSE OF YOUR BREATHING PROBLEMS: DISAGREE/UNSURE
DURING THE PAST 4 WEEKS HOW MUCH DID YOU FEEL SHORT OF BREATH: NONE/LITTLE OF THE TIME
DO YOU EVER COUGH UP ANY MUCUS OR PHLEGM?: NO/ONLY WITH OCCASIONAL COLDS OR INFECTIONS
HAVE YOU SMOKED AT LEAST 100 CIGARETTES IN YOUR ENTIRE LIFE: NO/DON'T KNOW
HAVE YOU SMOKED AT LEAST 100 CIGARETTES IN YOUR ENTIRE LIFE: YES

## 2018-04-30 ASSESSMENT — PAIN SCALES - GENERAL
PAINLEVEL_OUTOF10: 0
PAINLEVEL_OUTOF10: 3
PAINLEVEL_OUTOF10: 0
PAINLEVEL_OUTOF10: 3
PAINLEVEL_OUTOF10: 2
PAINLEVEL_OUTOF10: 3
PAINLEVEL_OUTOF10: 0
PAINLEVEL_OUTOF10: 3
PAINLEVEL_OUTOF10: 0
PAINLEVEL_OUTOF10: 0
PAINLEVEL_OUTOF10: 3
PAINLEVEL_OUTOF10: 3

## 2018-04-30 ASSESSMENT — ENCOUNTER SYMPTOMS
ABDOMINAL PAIN: 0
EYES NEGATIVE: 1
MYALGIAS: 0
BRUISES/BLEEDS EASILY: 0
MUSCULOSKELETAL NEGATIVE: 1
GASTROINTESTINAL NEGATIVE: 1
CARDIOVASCULAR NEGATIVE: 1
NAUSEA: 0
VOMITING: 0
HEADACHES: 0
PSYCHIATRIC NEGATIVE: 1
DEPRESSION: 0
CLAUDICATION: 0
SHORTNESS OF BREATH: 1
FEVER: 0
FOCAL WEAKNESS: 0
BLURRED VISION: 0
NERVOUS/ANXIOUS: 0
DOUBLE VISION: 0
WEIGHT LOSS: 0
CHILLS: 0
COUGH: 0
WEAKNESS: 1
DIZZINESS: 0
PALPITATIONS: 0

## 2018-04-30 ASSESSMENT — LIFESTYLE VARIABLES
EVER_SMOKED: YES
EVER_SMOKED: YES
ALCOHOL_USE: NO

## 2018-04-30 ASSESSMENT — PATIENT HEALTH QUESTIONNAIRE - PHQ9
SUM OF ALL RESPONSES TO PHQ9 QUESTIONS 1 AND 2: 0
2. FEELING DOWN, DEPRESSED, IRRITABLE, OR HOPELESS: NOT AT ALL
1. LITTLE INTEREST OR PLEASURE IN DOING THINGS: NOT AT ALL

## 2018-04-30 NOTE — OP REPORT
DATE OF SERVICE:  4/30/18     REFERRING PHYSICIAN:  Dr. Marie     PREOPERATIVE DIAGNOSES:  Severe aortic stenosis (calcific or degenerative),      POSTOPERATIVE DIAGNOSES:  Severe aortic stenosis (calcific or degenerative),      PROCEDURES:  Transcatheter aortic valve replacement (TAVR 26 mm S3 Mont[place] Oca   pericardial valve), and intraoperative transthoracic echocardiography.     SURGEON:  Lauro Zacarias MD and Napoleon Pepe MD     ANESTHESIOLOGIST:  Arian Salcedo MD.     ANESTHESIA: Conscious sedation.     ESTIMATED BLOOD LOSS:  Minimal.     COMPLICATIONS:  None.     INDICATIONS:  The patient is a pleasant 82-year-old female with severe   symptomatic aortic stenosis. The patient was thus referred for transcatheter aortic valve replacement.     DESCRIPTION OF PROCEDURE:  The patient was brought to the operating room and   placed on the operating room table in the supine position.  After successful   Initiation of conscious sedation, the patient was   prepped and draped in the usual sterile fashion.  In collaboration with Dr. Pepe, bilateral femoral   ultrasound-guided arterial access was obtained.  Dr. Pepe positioned a   temporary transvenous pacemaker via the left common femoral vein.  The deployment   angle was determined.  A small 1 cm incision was made in the right groin.  Two   Perclose sutures were deployed.  A 14-Azeri eSheath was then placed in the   left common femoral artery and its tip was positioned in the abdominal aorta.    The aortic valve was crossed with a wire.  Aortic balloon valvuloplasty was   performed first.  I positioned the balloon across the aortic valve   and Dr. Pepe inflated it during valvuloplasty.  This catheter was exchanged for a   valve deployment catheter with a 26 mm S3 Montes De Oca pericardial valve at its   tip.  I positioned the valve across the annulus and Dr. Pepe inflated the   balloon during valve implantation.  Wires and catheters were removed.    Intraoperative  transesophageal echocardiography did not show any paravalvular   or central leaks. The right femoral eSheath was removed and the Perclose   sutures were tightened down.  When adequate hemostasis had been obtained, the   small right groin incision using a single vicryl sututre.  The   remainder of the operation will be dictated by Dr. Pepe.  There were no   apparent complications.  The patient tolerated the procedure well and left the   operating room in stable condition.        ____________________________________  GRETCHEN MERINO MD

## 2018-04-30 NOTE — OR NURSING
1040: received to PACU via gurney. Sleepy. No signs or symptoms of any distress noted. With gauze and tegaderm dressing to right groin CDI. Derma martinez to left groin area. 2 plus pedal pulses bilaterally.  1100: sips of water given. Tolerated well.  1132: c/o mid back pain and left shoulder pain. Medicated. See MAR  1155: report called to Ryan MCCLENDON bilateral groin soft. No hematoma or bleeding noted,  1233: transported via gurney to [facility]- with O2 at 3 L/nc and cardiac monitor, stable

## 2018-04-30 NOTE — PROGRESS NOTES
Inpatient Anticoagulation Service Note    Date: 4/30/2018  Reason for Anticoagulation: Deep Vein Thrombosis        Hemoglobin Value: 12.2  Hematocrit Value: 38.6  Lab Platelet Value: (!) 149  Target INR: 2.0 to 3.0    INR from last 7 days     Date/Time INR Value    04/30/18 0715 (!)  1.19        Dose from last 7 days     Date/Time Dose (mg)    04/30/18 1000  6        Average Dose (mg):  (Home dose: 6mg PO daily per [facility])  Significant Interactions: Clopidogrel, Statin, Thyroid Medications  Bridge Therapy: No     Reversal Agent Administered: Not Applicable  Comments: Admit for TAVR 4/30. Continue home warfarin for hx of DVT. Home dose identified above. Pt is followed by [facility]. INR subtherapeutic as expected given pt held for procedure. Restart home dose. H/H appears stable with no indication of bleeding. DDI identified above. Pharamcy to CTM.     Plan:  Warfarin 6mg with INR check tomorrow  Education Material Provided?: No (Chronic Tx)  Pharmacist suggested discharge dosing: Warfarin 6mg PO daily with close f/u within 3 days       Philomena Starks, PharmD., BCPS

## 2018-04-30 NOTE — PROGRESS NOTES
Cardiology Progress Note               Author: Napoleon Pepe Date & Time created: 2018  10:42 AM     Interval History/Chief Complaint:  82 year old female status post TAVR.    Review of Systems   Constitutional: Negative.  Negative for chills, fever, malaise/fatigue and weight loss.   HENT: Negative.  Negative for hearing loss.    Eyes: Negative.  Negative for blurred vision and double vision.   Respiratory: Negative.  Negative for cough and shortness of breath.    Cardiovascular: Negative.  Negative for chest pain, palpitations, claudication and leg swelling.   Gastrointestinal: Negative.  Negative for abdominal pain, nausea and vomiting.   Genitourinary: Negative.  Negative for dysuria and urgency.   Musculoskeletal: Negative.  Negative for joint pain and myalgias.   Skin: Negative.  Negative for itching and rash.   Neurological: Negative.  Negative for dizziness, focal weakness, weakness and headaches.   Endo/Heme/Allergies: Negative.  Does not bruise/bleed easily.   Psychiatric/Behavioral: Negative.  Negative for depression. The patient is not nervous/anxious.        Physical Exam   Constitutional: She is oriented to person, place, and time. She appears well-developed and well-nourished.   HENT:   Head: Normocephalic and atraumatic.   Eyes: Conjunctivae are normal. Pupils are equal, round, and reactive to light.   Neck: Normal range of motion. Neck supple.   Cardiovascular: Normal rate and regular rhythm.    Pulmonary/Chest: Effort normal and breath sounds normal.   Abdominal: Soft. Bowel sounds are normal.   Musculoskeletal: Normal range of motion. She exhibits no edema.   Neurological: She is alert and oriented to person, place, and time.   Skin: Skin is warm and dry.   Psychiatric: She has a normal mood and affect.     Hemodynamics:  Temp (24hrs), Av.7 °C (98.1 °F), Min:36.7 °C (98.1 °F), Max:36.7 °C (98.1 °F)  Temperature: 36.7 °C (98.1 °F)  Pulse  Av  Min: 85  Max: 85   Blood Pressure : 154/77      Respiratory:    Respiration: 16, Pulse Oximetry: 97 %           Fluids:     Weight: 74.1 kg (163 lb 5.8 oz)  GI/Nutrition:  No orders of the defined types were placed in this encounter.    Lab Results:  Recent Labs      04/30/18 0715   WBC  4.6*   RBC  4.45   HEMOGLOBIN  12.2   HEMATOCRIT  38.6   MCV  86.7   MCH  27.4   MCHC  31.6*   RDW  43.2   PLATELETCT  149*   MPV  12.1     Recent Labs      04/30/18   0715   SODIUM  136   POTASSIUM  3.7   CHLORIDE  103   CO2  25   GLUCOSE  119*   BUN  13   CREATININE  0.91   CALCIUM  9.4     Recent Labs      04/27/18   1449  04/30/18   0715   INR  1.5  1.19*     Recent Labs      04/30/18 0715   BNPBTYPENAT  384*     Recent Labs      04/30/18 0715   BNPBTYPENAT  384*         Medications reviewed.      Current Facility-Administered Medications:   •  lidocaine (XYLOCAINE) 1%  injection, 0.5 mL, Intradermal, Once PRN, Napoleon Pepe M.D., 0.5 mL at 04/30/18 0723  •  heparin 2,000 Units in electrolyte-A (PLASMALYTE-A) 1,000 mL, , , Intra-Op Once PRN, Napoleon Pepe M.D., 2,000 Units at 04/30/18 0927  •  lidocaine (XYLOCAINE) 2 % injection, , , Intra-Op Once PRN, Napoleon Pepe M.D., 20 mL at 04/30/18 0928  •  bupivacaine 0.25% (SENSORCAINE-MARCAINE) pf injection, , , Intra-Op Once PRN, Napoleon Pepe M.D., 10 mL at 04/30/18 0928  •  [START ON 5/6/2018] alendronate (FOSAMAX) tablet 35 mg, 35 mg, Oral, Q7 DAYS, Poonam Garg, A.P.R.N.  •  anastrozole (ARIMIDEX) tablet 1 mg, 1 mg, Oral, DAILY, Poonam Garg, A.P.R.N.  •  ascorbic acid tablet 500 mg, 500 mg, Oral, DAILY, Poonam Garg, A.P.R.N.  •  atorvastatin (LIPITOR) tablet 40 mg, 40 mg, Oral, Nightly, Poonam Garg, OMAR.P.R.N.  •  calcium carbonate (OS-JAMES 500) tablet 500 mg, 500 mg, Oral, DAILY, OMAR Palacios.P.R.N.  •  cholecalciferol (VITAMIN D3) tablet 400 Units, 400 Units, Oral, BID, OMAR Palacios.P.R.N.  •  clopidogrel (PLAVIX) tablet 75 mg, 75 mg, Oral, DAILY, Poonam NELSON  Britany, A.P.R.N.  •  cyanocobalamin (VITAMIN B-12) tablet 500 mcg, 500 mcg, Oral, DAILY, OMAR Palacios.P.R.N.  •  [START ON 5/1/2018] levothyroxine (SYNTHROID) tablet 112 mcg, 112 mcg, Oral, AM ES, Poonam Garg A.P.R.N.  •  magnesium citrate solution 148 mL, 148 mL, Oral, DAILY, Poonam Garg A.P.R.N.  •  metoprolol (LOPRESSOR) tablet 50 mg, 50 mg, Oral, BID, Poonam Garg, A.P.R.N.  •  oxybutynin (DITROPAN) tablet 5 mg, 5 mg, Oral, BID, Poonam Garg, A.P.R.N.  •  potassium chloride (KLOR-CON) 20 MEQ packet 20 mEq, 20 mEq, Oral, DAILY, Poonam Garg A.P.R.N.  •  Respiratory Care per Protocol, , Nebulization, Continuous RT, Poonam Garg A.P.R.N.  •  NS infusion, , Intravenous, Continuous, Poonam Garg A.P.R.N.  •  hydrALAZINE (APRESOLINE) injection 10 mg, 10 mg, Intravenous, Q30 MIN PRN, Poonam Garg, A.P.R.N.  •  acetaminophen (TYLENOL) tablet 650 mg, 650 mg, Oral, Q6HRS PRN, Poonam Garg A.P.R.N.  •  diphenhydrAMINE (BENADRYL) tablet/capsule 25 mg, 25 mg, Oral, HS PRN, Poonam Garg A.P.R.N.  •  ondansetron (ZOFRAN) syringe/vial injection 4 mg, 4 mg, Intravenous, Q4HRS PRN, Poonam Garg A.P.R.N.  •  diphenhydrAMINE (BENADRYL) injection 25 mg, 25 mg, Intravenous, Q6HRS PRN, Poonam Garg A.P.R.N.  •  furosemide (LASIX) injection 40 mg, 40 mg, Intravenous, BID DIURETIC, OMAR Palacios.P.RSUNSHINE.  •  MD ALERT... warfarin (COUMADIN) per pharmacy protocol, , Other, pharmacy to dose, VISHAL PalaciosRSUNSHINE.  •  warfarin (COUMADIN) tablet 6 mg, 6 mg, Oral, COUMADIN-DAILY, LYLY PalaciosP.RSUNSHINE.  •  iohexol (OMNIPAQUE) 300 mg/mL, , , Intra-Op Once PRN, Napoleon Pepe M.D., 70 mL at 04/30/18 1024    CARDIAC STUDIES/PROCEDURES:     CARDIAC CATHETERIZATION CONCLUSIONS (03/20/18)  1.  Multivessel coronary artery disease.  2.  Successful percutaneous transluminal coronary angioplasty/stent placement   of the  proximal left anterior descending artery with 2.75x8 mm Synergy   drug-eluting stent.  3.  Successful percutaneous transluminal coronary angioplasty/stent placement   of the mid left anterior descending artery with 2.5x16 mm Synergy drug-eluting stent.  4.  Successful percutaneous transluminal coronary angioplasty/stent placement   of the proximal diagonal branch with 2.5x8 mm Synergy drug-eluting stent.  5.  Successful percutaneous transluminal coronary angioplasty/stent placement   of the ostial first obtuse marginal branch with 2.5x12 mm Synergy drug-eluting stent.     CARDIAC CATHETERIZATION CONCLUSIONS (03/13/18)  1.  Severe aortic stenosis with peak gradient of 100 mmHg, mean gradient of 85   mmHg, calculated JULES of 0.38 cm2.  2.  Severe 3-vessel coronary artery disease with high-grade proximal and mid   LAD stenosis, high-grade ostial proximal diagonal branch stenosis, high-grade   ostial proximal obtuse marginal branch stenosis, and chronic total occlusion   of the mid right coronary artery with distal vessel filling via left to right   collaterals.  3.  Normal left ventricular systolic function with ejection fraction of 60%.  4.  Elevated left ventricular end-diastolic pressure.  5.  Elevated right heart pressure with PA systolic pressure of 50 mmHg.  6.  Mildly dilated ascending aorta with heavily calcified aortic valve.  7.  Heavily calcified descending abdominal aorta with luminal irregularities   of 30-40%, normal size bilateral iliofemoral system with tortuosity.     CAROTID ULTRASOUND (12/13/17)  Carotid ultrasound showing mild plaques.     CTA OF CHEST AND ABDOMEN (04/19/18)  1.  Severely calcified tricuspid aortic valve annulus measures 460 sq mm  2.  Coronary artery ostia are over 1 cm above the annulus  3.  Iliofemoral runoff calibers are at least 5.8 mm short axis left, 6.1 mm right  4.  Severe coronary artery disease    ECHOCARDIOGRAM CONCLUSIONS by Dr. Salcedo (04/30/18)  Results pending.      ECHOCARDIOGRAM CONCLUSIONS [facility] (11/28/17)  Echocardiogram showing low normal left ventricular systolic function, ejection fraction of 50%, severe aortic stenosis with peak velocity of 4.7 m/s, peak gradient of 91 mmHg, mean gradient of 57 mmHg and calculated JULES of 0.6-0.7 cm2, trace aortic regurgitation, mild mitral regurgitation, mild tricuspid regurgitation and estimated right ventricular systolic pressure of 33 mmHg.     EKG performed on (04/30/18) was reviewed: EKG shows sinus rhythm with left bundle branch block.  EKG performed on (03/20/18) was reviewed: EKG shows sinus rhythm.     Laboratory results of (03/21/18) were reviewed. Bun of 16 mg/dl, creatinine levels of 0.96 mg/dl noted.    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 Successful transcatheter aortic valve replacement (TAVR) with # 26 Montes De Oca Robbie S3 valve, transfemoral approach, under conscious sedation (04/30/18): She is clinically doing well.  2. Acute on chronic valvular diastolic congestive heart failure: The overall volume status is elevated. We will treat with start IV diuretic therapy  3. Coronary artery disease with stent placement: She is clinically doing well without recurrence of her angina. We will continue with current medical care.  4. Deep venous thrombosis in 12/10/17 at [facility] on (warfarin): She is clinically doing well. We will restart warfarin tonight. INR does not need to be therapeutic for discharge.    CC Tai Ramesh      Quality-Core Measures

## 2018-04-30 NOTE — PROGRESS NOTES
Received pt from PACU. VSS, A&Ox4. Reports of mid back pain, repositioned. Tele box placed, SR at 90 noted. Bed locked in low position, call light within reach. Updated on POC. Brother at bedside.

## 2018-04-30 NOTE — OP REPORT
DATE OF PROCEDURE: 04/30/18    REFERRING PHYSICIAN: Tai Ramesh    PROCEDURES:  1. Transcatheter aortic valve replacement.  2. Preclose closure.  3. Angio-Seal closure.  4. Ultrasound guided femoral artery and femoral vein access.    PRE PROCEDURAL DIAGNOSIS:  1. Severe symptomatic aortic stenosis, NYHA III.    POST PROCEDURAL DIAGNOSIS:  1. Successful transcatheter aortic valve replacement (TAVR) with # 26 [place] Robbie S3 valve, transfemoral approach, under conscious sedation.  2. Successful Preclose closure.  3. Successful Angio-Seal closure.    INDICATION:    82 year old female with cardiac condition including aortic stenosis, coronary artery disease status post stent placement. Due to her symptoms she was scheduled for TAVR.    DESCRIPTION OF PROCEDURE:  After informed consent was signed by the   patient, the patient was brought into the OR 19.   was prepped and draped in   usual sterile manner.  Prior to the procedure, right radial arterial insertion, by He.    The initial timeout was performed.     A 6-Malawian arterial sheath was placed in the right femoral artery by Modified Seldinger   Technique by myself. A 6-Malawian sheath was placed in the left femoral artery and a 6-Malawian sheath was placed in the left femoral vein by Lauro Parra.    Of note, all under sheath were placed under ultrasound guidance.     Through the venous femoral sheath, a temporary pacemaker was positioned at the   right ventricle.  Pacing was confirmed.  Through the left arterial sheath, a pigtail catheter   was positioned in the distal aorta and aortogram was performed. This catheter was advanced   to the ascending aorta at the valve level and aortogram was repeated. Over the right femoral   sheath PreClose closure was performed with 2 devices. An 8 -Malawian Sheath was placed.   Through this 8-Malawian sheath, an AL1 was positioned into the ascending aorta. This catheter   and sheath were removed after the insertion of a  Lunderquist wire.  Over the Lunderquist   wire a 14-Togolese expandable-sheath was advanced by myself.  IV heparin was given. Through   the expandable sheath,  an AL1 catheter was positioned at the ascending aorta.  The   Lunderquist wire was removed and exchanged for a straightwire. The AL1 catheter was   used to cross the aortic valve with this wire.  The AL1 catheter was exchanged for an   exchange length J wire.  Over this wire, a 6-Togolese pigtail catheter was positioned into   the left ventricle. Through the pigtail catheter an Amplatz extra stiff wire was positioned   across  the aortic valve. Over the Amplatz wire a 23x40 mm balloon was positioned across   the aortic valve.  Rapid pacing was initiated and valvuloplasty was performed. The balloon   was removed and the Commander delivery system was inserted.  The Robbie S3 valve   stent was loaded onto the balloon and positioned across the aortic valve.  Rapid pacing   was performed again and the stent was deployed. The delivery system was removed.   The valve was deployed by Dr. Zacarias.    The patient tolerated the procedure well. At the end of procedure, all pacemaker wire,   pigtail catheters and sheaths were removed.  The right femoral arteriotomy site was closed   via the PreClose system.  The left femoral arteriotomy site was closed via Angio-Seal.   The patient was transferred to PACU in stable condition.    IMPRESSION:  1. Successful transcatheter aortic valve replacement (TAVR) with # 26 Mont[place] Robbie S3 valve, transfemoral approach, under conscious sedation.  2. Successful Preclose closure.  3. Successful Angio-Seal closure.    RECOMMENDATION: Medical therapy with continuation of Plavix.

## 2018-04-30 NOTE — PROGRESS NOTES
Pt back to bed from chair. Pt feeling nauseated and states it is due to mag citrate solution, relieved with zofran. No reports of pain. Call light within reach.

## 2018-04-30 NOTE — CARE PLAN
Problem: Safety  Goal: Will remain free from injury    Intervention: Provide assistance with mobility  Educated on POC, up to chair for meals, ambulate 4 times daily with staff assist.      Problem: Pain Management  Goal: Pain level will decrease to patient's comfort goal  Pain scale used, meds as needed, non-pharmacological methods discussed.

## 2018-04-30 NOTE — H&P
Physician H&P    Patient ID:  Verónica Krishnan  [id number]  82 y.o. female  1935    History:  Primary Diagnosis: Outpatient TAVR.    HPI:  82 year old female with cardiac condition including aortic stenosis, coronary artery disease status post stent placement. Due to her symptoms she was scheduled for TAVR.    Past Medical History:  has a past medical history of Aortic stenosis; Breath shortness; Cancer ([facility]) (2015); Dental disorder; Heart burn; Heart valve disease; Hyperlipidemia; Hypertension; Hypoglycemia (2015); Indigestion; Jaundice (1935); Pneumonia; Unspecified cataract; Unspecified urinary incontinence; and Urinary bladder disorder.  Past Surgical History:  has a past surgical history that includes mastectomy (Left, 6/17/2015) and node biopsy sentinel (Left, 6/17/2015).  Past Social History:  reports that she quit smoking about 48 years ago. Her smoking use included Cigarettes. She quit after 15.00 years of use. She has never used smokeless tobacco. She reports that she drinks alcohol. She reports that she does not use drugs.  Past Family History:   Family History   Problem Relation Age of Onset   • Heart Attack Mother      Allergies: Aluminum; Amlodipine; Iron; Lisinopril; and Other food    Medications reviewed.    Current Medications:  Prior to Admission medications    Medication Sig Start Date End Date Taking? Authorizing Provider   warfarin (COUMADIN) 2 MG Tab Take 1-5 Tabs by mouth every day. 4/13/18   GIOVANNI Palacios   clopidogrel (PLAVIX) 75 MG Tab Take 1 Tab by mouth every day. 4/10/18   Napoleon Pepe M.D.   omeprazole (PRILOSEC) 20 MG delayed-release capsule Take 20 mg by mouth every day.    Physician Outpatient   alendronate (FOSAMAX) 35 MG tablet Take 35 mg by mouth every 7 days.    Physician Outpatient   atorvastatin (LIPITOR) 40 MG Tab Take 40 mg by mouth every evening.    Physician Outpatient   Furosemide 40 MG/4ML Solution Take  by mouth.    Physician Outpatient    levothyroxine (SYNTHROID) 112 MCG Tab Take 112 mcg by mouth Every morning on an empty stomach.    Physician Outpatient   metoprolol (LOPRESSOR) 50 MG Tab Take 50 mg by mouth 2 times a day.    Physician Outpatient   potassium chloride (KLOR-CON) 20 MEQ Pack Take 20 mEq by mouth 2 times a day.    Physician Outpatient   ranitidine (ZANTAC) 150 MG capsule Take  by mouth.    Physician Outpatient   Ascorbic Acid (VITAMIN C) 500 MG Cap Take  by mouth.    Physician Outpatient   aspirin 81 MG tablet Take 81 mg by mouth every day.    Physician Outpatient   LUTEIN PO Take 4 mg by mouth.    Physician Outpatient   MAGNESIUM CITRATE PO Take  by mouth.    Physician Outpatient   Zinc 25 MG Tab Take  by mouth.    Physician Outpatient   oxybutynin (DITROPAN) 5 MG Tab TAKE 1 TABLET TWICE A DAY 12/12/17   Margy Hernandes A.P.N.   anastrozole (ARIMIDEX) 1 MG Tab  2/7/16   Physician Outpatient   diphenhydrAMINE (BENADRYL) 25 MG TABS Take 25 mg by mouth at bedtime as needed for Sleep.    Physician Outpatient   Probiotic Product (PROBIOTIC ACIDOPHILUS BEADS PO) Take  by mouth every day.    Physician Outpatient   Calcium Carbonate 600 MG TABS Take  by mouth every day.    Physician Outpatient   Cholecalciferol (VITAMIN D) 400 UNIT TABS Take 400 Units by mouth 2 Times a Day.    Physician Outpatient   Cyanocobalamin (VITAMIN B-12) 5000 MCG SUBL Place  under tongue every day.    Physician Outpatient   Non Formulary Request Hydrauronic Acid   500mg  Once a day    Physician Outpatient       Review of Systems:  Review of Systems   Constitutional: Positive for malaise/fatigue. Negative for chills, fever and weight loss.   HENT: Negative.  Negative for hearing loss.    Eyes: Negative.  Negative for blurred vision and double vision.   Respiratory: Positive for shortness of breath. Negative for cough.    Cardiovascular: Negative.  Negative for chest pain, palpitations, claudication and leg swelling.   Gastrointestinal: Negative.  Negative for  "abdominal pain, nausea and vomiting.   Genitourinary: Negative.  Negative for dysuria and urgency.   Musculoskeletal: Negative.  Negative for joint pain and myalgias.   Skin: Negative.  Negative for itching and rash.   Neurological: Positive for weakness. Negative for dizziness, focal weakness and headaches.   Endo/Heme/Allergies: Negative.  Does not bruise/bleed easily.   Psychiatric/Behavioral: Negative.  Negative for depression. The patient is not nervous/anxious.      Blood pressure 154/77, pulse 85, temperature 36.7 °C (98.1 °F), resp. rate 16, height 1.651 m (5' 5\"), weight 74.1 kg (163 lb 5.8 oz), SpO2 97 %.    Physical Examination:  Physical Exam   Constitutional: She is oriented to person, place, and time. She appears well-developed and well-nourished.   HENT:   Head: Normocephalic and atraumatic.   Eyes: Conjunctivae are normal. Pupils are equal, round, and reactive to light.   Neck: Normal range of motion. Neck supple.   Cardiovascular: Normal rate and regular rhythm.    Murmur heard.  Pulmonary/Chest: Effort normal and breath sounds normal.   Abdominal: Soft. Bowel sounds are normal.   Musculoskeletal: Normal range of motion. She exhibits no edema.   Neurological: She is alert and oriented to person, place, and time.   Skin: Skin is warm and dry.   Psychiatric: She has a normal mood and affect.     CARDIAC STUDIES/PROCEDURES:    CARDIAC CATHETERIZATION CONCLUSIONS (03/20/18)  1.  Multivessel coronary artery disease.  2.  Successful percutaneous transluminal coronary angioplasty/stent placement   of the proximal left anterior descending artery with 2.75x8 mm Synergy   drug-eluting stent.  3.  Successful percutaneous transluminal coronary angioplasty/stent placement   of the mid left anterior descending artery with 2.5x16 mm Synergy drug-eluting stent.  4.  Successful percutaneous transluminal coronary angioplasty/stent placement   of the proximal diagonal branch with 2.5x8 mm Synergy drug-eluting " stent.  5.  Successful percutaneous transluminal coronary angioplasty/stent placement   of the ostial first obtuse marginal branch with 2.5x12 mm Synergy drug-eluting stent.    CARDIAC CATHETERIZATION CONCLUSIONS (03/13/18)  1.  Severe aortic stenosis with peak gradient of 100 mmHg, mean gradient of 85   mmHg, calculated JULES of 0.38 cm2.  2.  Severe 3-vessel coronary artery disease with high-grade proximal and mid   LAD stenosis, high-grade ostial proximal diagonal branch stenosis, high-grade   ostial proximal obtuse marginal branch stenosis, and chronic total occlusion   of the mid right coronary artery with distal vessel filling via left to right   collaterals.  3.  Normal left ventricular systolic function with ejection fraction of 60%.  4.  Elevated left ventricular end-diastolic pressure.  5.  Elevated right heart pressure with PA systolic pressure of 50 mmHg.  6.  Mildly dilated ascending aorta with heavily calcified aortic valve.  7.  Heavily calcified descending abdominal aorta with luminal irregularities   of 30-40%, normal size bilateral iliofemoral system with tortuosity.     CAROTID ULTRASOUND (12/13/17)  Carotid ultrasound showing mild plaques.    CTA OF CHEST AND ABDOMEN (04/19/18)  1.  Severely calcified tricuspid aortic valve annulus measures 460 sq mm  2.  Coronary artery ostia are over 1 cm above the annulus  3.  Iliofemoral runoff calibers are at least 5.8 mm short axis left, 6.1 mm right  4.  Severe coronary artery disease    ECHOCARDIOGRAM CONCLUSIONS [facility] (11/28/17)  Echocardiogram showing low normal left ventricular systolic function, ejection fraction of 50%, severe aortic stenosis with peak velocity of 4.7 m/s, peak gradient of 91 mmHg, mean gradient of 57 mmHg and calculated JULES of 0.6-0.7 cm2, trace aortic regurgitation, mild mitral regurgitation, mild tricuspid regurgitation and estimated right ventricular systolic pressure of 33 mmHg.     EKG performed on (03/20/18) was reviewed: EKG  shows sinus rhythm.     Laboratory results of (03/21/18) were reviewed. Bun of 16 mg/dl, creatinine levels of 0.96 mg/dl noted.    Impression/Plan:    1. Aortic stenosis: She is symptomatic with his severe aortic stenosis, NYHA class IIIB. We will proceed with TAVR. She understands the risks and benefits and agrees with plan..  2. Chronic diastolic congestive heart failure: The overall volume status is adequate.  3. Coronary artery disease with stent placement: She is clinically doing well without recurrence of her angina.  We will continue with current medical care.  4. Deep venous thrombosis in 12/10/17 at [facility] on (warfarin): She is clinically doing well off of chronic anticoagulation therapy.      The risks, benefits, and alternatives to coronary angiography with IV sedation were discussed in great detail. Specific risks mentioned include bleeding, infection, kidney damage, allergic reaction, cardiac perforation with possible tamponade requiring celi-cardiocentesis or possible open heart surgery. In addition, we discussed that 10% of patients will experience small to moderate bruising at the side of the arterial puncture. Lastly the risks of heart attack, stroke, and death were discussed; the risks of major complications such as heart attack or stroke caused by the angiogram is less than 1%; the risk of death is approximately 1 in 1000. The patient verbalized understanding of these potential complications and wishes to proceed with this procedure.     CC Tai Ramesh    Pre Procedure Assessment:  <EXAMSHORT2>      Pre Procedure update:   No changes.    Napoleon Pepe  4/30/2018

## 2018-05-01 ENCOUNTER — APPOINTMENT (OUTPATIENT)
Dept: RADIOLOGY | Facility: MEDICAL CENTER | Age: 83
DRG: 266 | End: 2018-05-01
Attending: NURSE PRACTITIONER
Payer: COMMERCIAL

## 2018-05-01 LAB
ALBUMIN SERPL BCP-MCNC: 3.2 G/DL (ref 3.2–4.9)
ALBUMIN/GLOB SERPL: 1.6 G/DL
ALP SERPL-CCNC: 70 U/L (ref 30–99)
ALT SERPL-CCNC: 13 U/L (ref 2–50)
ANION GAP SERPL CALC-SCNC: 6 MMOL/L (ref 0–11.9)
AST SERPL-CCNC: 24 U/L (ref 12–45)
BILIRUB SERPL-MCNC: 0.9 MG/DL (ref 0.1–1.5)
BNP SERPL-MCNC: 638 PG/ML (ref 0–100)
BUN SERPL-MCNC: 14 MG/DL (ref 8–22)
CALCIUM SERPL-MCNC: 8.5 MG/DL (ref 8.5–10.5)
CHLORIDE SERPL-SCNC: 102 MMOL/L (ref 96–112)
CO2 SERPL-SCNC: 25 MMOL/L (ref 20–33)
CREAT SERPL-MCNC: 0.87 MG/DL (ref 0.5–1.4)
EKG IMPRESSION: NORMAL
EKG IMPRESSION: NORMAL
ERYTHROCYTE [DISTWIDTH] IN BLOOD BY AUTOMATED COUNT: 43.8 FL (ref 35.9–50)
GLOBULIN SER CALC-MCNC: 2 G/DL (ref 1.9–3.5)
GLUCOSE SERPL-MCNC: 106 MG/DL (ref 65–99)
HCT VFR BLD AUTO: 31.4 % (ref 37–47)
HGB BLD-MCNC: 10 G/DL (ref 12–16)
INR PPP: 1.22 (ref 0.87–1.13)
LV EJECT FRACT  99904: 50
LV EJECT FRACT  99904: 55
MCH RBC QN AUTO: 27.6 PG (ref 27–33)
MCHC RBC AUTO-ENTMCNC: 31.8 G/DL (ref 33.6–35)
MCV RBC AUTO: 86.7 FL (ref 81.4–97.8)
PLATELET # BLD AUTO: 111 K/UL (ref 164–446)
PMV BLD AUTO: 11.9 FL (ref 9–12.9)
POTASSIUM SERPL-SCNC: 3.5 MMOL/L (ref 3.6–5.5)
PROT SERPL-MCNC: 5.2 G/DL (ref 6–8.2)
PROTHROMBIN TIME: 15.1 SEC (ref 12–14.6)
RBC # BLD AUTO: 3.62 M/UL (ref 4.2–5.4)
SODIUM SERPL-SCNC: 133 MMOL/L (ref 135–145)
WBC # BLD AUTO: 5.5 K/UL (ref 4.8–10.8)

## 2018-05-01 PROCEDURE — 83880 ASSAY OF NATRIURETIC PEPTIDE: CPT

## 2018-05-01 PROCEDURE — 700102 HCHG RX REV CODE 250 W/ 637 OVERRIDE(OP): Performed by: NURSE PRACTITIONER

## 2018-05-01 PROCEDURE — 93931 UPPER EXTREMITY STUDY: CPT | Mod: 26 | Performed by: INTERNAL MEDICINE

## 2018-05-01 PROCEDURE — 85027 COMPLETE CBC AUTOMATED: CPT

## 2018-05-01 PROCEDURE — G8979 MOBILITY GOAL STATUS: HCPCS | Mod: CI

## 2018-05-01 PROCEDURE — 71045 X-RAY EXAM CHEST 1 VIEW: CPT

## 2018-05-01 PROCEDURE — 770020 HCHG ROOM/CARE - TELE (206)

## 2018-05-01 PROCEDURE — 700111 HCHG RX REV CODE 636 W/ 250 OVERRIDE (IP): Performed by: NURSE PRACTITIONER

## 2018-05-01 PROCEDURE — 93306 TTE W/DOPPLER COMPLETE: CPT

## 2018-05-01 PROCEDURE — 93926 LOWER EXTREMITY STUDY: CPT

## 2018-05-01 PROCEDURE — A9270 NON-COVERED ITEM OR SERVICE: HCPCS | Performed by: NURSE PRACTITIONER

## 2018-05-01 PROCEDURE — 93010 ELECTROCARDIOGRAM REPORT: CPT | Performed by: INTERNAL MEDICINE

## 2018-05-01 PROCEDURE — 36415 COLL VENOUS BLD VENIPUNCTURE: CPT

## 2018-05-01 PROCEDURE — 97161 PT EVAL LOW COMPLEX 20 MIN: CPT

## 2018-05-01 PROCEDURE — 93306 TTE W/DOPPLER COMPLETE: CPT | Mod: 26 | Performed by: INTERNAL MEDICINE

## 2018-05-01 PROCEDURE — 94760 N-INVAS EAR/PLS OXIMETRY 1: CPT

## 2018-05-01 PROCEDURE — 85610 PROTHROMBIN TIME: CPT

## 2018-05-01 PROCEDURE — A9270 NON-COVERED ITEM OR SERVICE: HCPCS | Performed by: INTERNAL MEDICINE

## 2018-05-01 PROCEDURE — G8978 MOBILITY CURRENT STATUS: HCPCS | Mod: CI

## 2018-05-01 PROCEDURE — 93922 UPR/L XTREMITY ART 2 LEVELS: CPT

## 2018-05-01 PROCEDURE — 93005 ELECTROCARDIOGRAM TRACING: CPT | Performed by: NURSE PRACTITIONER

## 2018-05-01 PROCEDURE — 700102 HCHG RX REV CODE 250 W/ 637 OVERRIDE(OP): Performed by: INTERNAL MEDICINE

## 2018-05-01 PROCEDURE — 93971 EXTREMITY STUDY: CPT

## 2018-05-01 PROCEDURE — G8980 MOBILITY D/C STATUS: HCPCS | Mod: CI

## 2018-05-01 PROCEDURE — 93922 UPR/L XTREMITY ART 2 LEVELS: CPT | Mod: 26 | Performed by: INTERNAL MEDICINE

## 2018-05-01 PROCEDURE — 80053 COMPREHEN METABOLIC PANEL: CPT

## 2018-05-01 RX ADMIN — WARFARIN SODIUM 6 MG: 6 TABLET ORAL at 17:15

## 2018-05-01 RX ADMIN — ANASTROZOLE 1 MG: 1 TABLET, FILM COATED ORAL at 06:31

## 2018-05-01 RX ADMIN — Medication 500 MCG: at 06:33

## 2018-05-01 RX ADMIN — OXYBUTYNIN CHLORIDE 5 MG: 5 TABLET ORAL at 06:33

## 2018-05-01 RX ADMIN — METOPROLOL TARTRATE 50 MG: 50 TABLET, FILM COATED ORAL at 06:31

## 2018-05-01 RX ADMIN — ASPIRIN 81 MG: 81 TABLET, COATED ORAL at 08:56

## 2018-05-01 RX ADMIN — OXYCODONE HYDROCHLORIDE AND ACETAMINOPHEN 500 MG: 500 TABLET ORAL at 06:34

## 2018-05-01 RX ADMIN — OXYBUTYNIN CHLORIDE 5 MG: 5 TABLET ORAL at 17:16

## 2018-05-01 RX ADMIN — LEVOTHYROXINE SODIUM 112 MCG: 112 TABLET ORAL at 05:47

## 2018-05-01 RX ADMIN — POLYETHYLENE GLYCOL 3350 1 PACKET: 17 POWDER, FOR SOLUTION ORAL at 06:22

## 2018-05-01 RX ADMIN — ACETAMINOPHEN 650 MG: 325 TABLET, FILM COATED ORAL at 11:06

## 2018-05-01 RX ADMIN — ATORVASTATIN CALCIUM 40 MG: 40 TABLET, FILM COATED ORAL at 19:49

## 2018-05-01 RX ADMIN — FUROSEMIDE 40 MG: 10 INJECTION, SOLUTION INTRAMUSCULAR; INTRAVENOUS at 05:52

## 2018-05-01 RX ADMIN — FUROSEMIDE 40 MG: 10 INJECTION, SOLUTION INTRAMUSCULAR; INTRAVENOUS at 15:04

## 2018-05-01 RX ADMIN — CHOLECALCIFEROL TAB 10 MCG (400 UNIT) 400 UNITS: 10 TAB at 17:15

## 2018-05-01 RX ADMIN — CLOPIDOGREL 75 MG: 75 TABLET, FILM COATED ORAL at 06:31

## 2018-05-01 RX ADMIN — METOPROLOL TARTRATE 50 MG: 50 TABLET, FILM COATED ORAL at 17:15

## 2018-05-01 RX ADMIN — ACETAMINOPHEN 650 MG: 325 TABLET, FILM COATED ORAL at 17:15

## 2018-05-01 RX ADMIN — CHOLECALCIFEROL TAB 10 MCG (400 UNIT) 400 UNITS: 10 TAB at 06:31

## 2018-05-01 RX ADMIN — Medication 500 MG: at 06:32

## 2018-05-01 RX ADMIN — POTASSIUM CHLORIDE 20 MEQ: 1500 TABLET, EXTENDED RELEASE ORAL at 06:34

## 2018-05-01 ASSESSMENT — ENCOUNTER SYMPTOMS
WEAKNESS: 0
MUSCULOSKELETAL NEGATIVE: 1
HEADACHES: 0
MYALGIAS: 0
DIZZINESS: 0
CONSTITUTIONAL NEGATIVE: 1
EYES NEGATIVE: 1
FOCAL WEAKNESS: 0
NEUROLOGICAL NEGATIVE: 1
DEPRESSION: 0
GASTROINTESTINAL NEGATIVE: 1
NERVOUS/ANXIOUS: 0
SHORTNESS OF BREATH: 0
VOMITING: 0
NAUSEA: 0
WEIGHT LOSS: 0
FEVER: 0
BRUISES/BLEEDS EASILY: 0
CARDIOVASCULAR NEGATIVE: 1
CLAUDICATION: 0
PSYCHIATRIC NEGATIVE: 1
CHILLS: 0
ABDOMINAL PAIN: 0
RESPIRATORY NEGATIVE: 1
PALPITATIONS: 0
BLURRED VISION: 0
DOUBLE VISION: 0
COUGH: 0

## 2018-05-01 ASSESSMENT — PAIN SCALES - GENERAL
PAINLEVEL_OUTOF10: 5
PAINLEVEL_OUTOF10: 7
PAINLEVEL_OUTOF10: 3
PAINLEVEL_OUTOF10: 7
PAINLEVEL_OUTOF10: 6
PAINLEVEL_OUTOF10: 4
PAINLEVEL_OUTOF10: 7
PAINLEVEL_OUTOF10: 3
PAINLEVEL_OUTOF10: 7
PAINLEVEL_OUTOF10: 0

## 2018-05-01 ASSESSMENT — GAIT ASSESSMENTS
DISTANCE (FEET): 250
ASSISTIVE DEVICE: FRONT WHEEL WALKER
GAIT LEVEL OF ASSIST: SUPERVISED

## 2018-05-01 ASSESSMENT — COGNITIVE AND FUNCTIONAL STATUS - GENERAL
MOBILITY SCORE: 24
SUGGESTED CMS G CODE MODIFIER MOBILITY: CH

## 2018-05-01 NOTE — PROGRESS NOTES
"Pt resting in bed. Pt c/o generalized discomfort, per pt \"the bed is very uncomfortable.\" Pt provided with heat packs and medicated per mar. Pt placed on her baseline of 2l oxygen via nc for night. Pt denies any other needs at this time.   "

## 2018-05-01 NOTE — PROGRESS NOTES
Inpatient Anticoagulation Service Note  Date: 5/1/2018  Reason for Anticoagulation: Deep Vein Thrombosis   Hemoglobin Value: (!) 10  Hematocrit Value: (!) 31.4  Lab Platelet Value: (!) 111  Target INR: 2.0 to 3.0  INR from last 7 days     Date/Time INR Value    05/01/18 0328 (!)  1.22    04/30/18 0715 (!)  1.19        Dose from last 7 days     Date/Time Dose (mg)    05/01/18 1100  6    04/30/18 1000  6        Average Dose (mg):TBD  (Home Dose: 6 mg daily)  Significant Interactions: Clopidogrel, Statin, Thyroid Medications, Other (Comments) (furosemide)  Bridge Therapy: No   Reversal Agent Administered: Not Applicable  Comments: INR unmoved. H/H low. PLT low. History of DVT noted from [facility]. No overt bleeding noted. Warfarin interactions noted (including ASA x1 5/1/18). Will give 6 mg again today and trend INR with AM labs post-op from TAVR.    Plan:  Warfarin 6 mg 5/1/18  Education Material Provided?: No (chronic warfarin patient)  Pharmacist suggested discharge dosing: warfarin 6 mg daily (recommend INR within 72 hours of discharge)    Beka Cordero, PharmD

## 2018-05-01 NOTE — THERAPY
"Physical Therapy Evaluation completed.   Bed Mobility:  Supine to Sit:  (up chair)  Transfers: Sit to Stand: Supervised  Gait: Level Of Assist: Supervised with No Equipment Needed       Plan of Care: Patient with no further skilled PT needs in the acute care setting at this time  Discharge Recommendations: Equipment: No Equipment Needed.  Pt presents s/p TAVR with sore R calf, negative for DVT, pain improving with ambulation. Pt will have assist at home from her brother as needed. Phase I cardiac rehab education done. No further inpt PT needs.   See \"Rehab Therapy-Acute\" Patient Summary Report for complete documentation.     "

## 2018-05-01 NOTE — PROGRESS NOTES
Patient complaining of numbness and tingling in fingers on R. Hand and states that dressing for R. Radial site feels too tight.  Redressed with gauze and tegaderm and patient states numbness and tingling improving.

## 2018-05-01 NOTE — PROGRESS NOTES
Patient complaining of 7/10 pain in R calf with movement and 6/10 headache. Patient states tylenol does not normally work for her pain. Paged Poonam ESPOSITO, new orders received.

## 2018-05-01 NOTE — CARE PLAN
Problem: Safety  Goal: Will remain free from injury  Outcome: PROGRESSING AS EXPECTED  Fall precautions in place. Treaded socks on pt. Appropriate signs on doorway.Bedrails up. Bed in lowest position and locked.  Call light and phone within reach. Patient educated on importance of calling nurses before getting out of bed, verbalizes understanding.     Problem: Knowledge Deficit  Goal: Knowledge of disease process/condition, treatment plan, diagnostic tests, and medications will improve  Outcome: PROGRESSING AS EXPECTED  Patient educated about POC.  All questions answered in regards to disease process, treatment, and diet.  Patient verbalized understanding and voiced no further questions at this time.

## 2018-05-01 NOTE — PROGRESS NOTES
2 RN skin check complete. Bilateral groin incision sites are clean and intact. Skin is warm, dry, and intact.

## 2018-05-01 NOTE — PROGRESS NOTES
Bedside report received, pt care assumed, tele box on.  Pt complaining of continuing R calf pain.  Called ultrasound and tech should be at bedside for LE duplex in approximately 30 minutes.   Bed in lowest position, call light within reach.

## 2018-05-01 NOTE — PROGRESS NOTES
Bedside report received from day RN. Assumed pt care. Pt a&ox4. POC discussed. Pt denies any needs at this time. Bilateral groin sites assessed. Soft, CDI. Bed locked and in lowest position. Call light within reach. Hourly rounding in place.

## 2018-05-01 NOTE — PROGRESS NOTES
Cardiology Progress Note               Author: Napoleondani Pepe Date & Time created: 2018  12:36 PM     Interval History/Chief Complaint:  82 year old female status post TAVR.    Review of Systems   Constitutional: Negative.  Negative for chills, fever, malaise/fatigue and weight loss.   HENT: Negative.  Negative for hearing loss.    Eyes: Negative.  Negative for blurred vision and double vision.   Respiratory: Negative.  Negative for cough and shortness of breath.    Cardiovascular: Positive for leg swelling. Negative for chest pain, palpitations and claudication.   Gastrointestinal: Negative.  Negative for abdominal pain, nausea and vomiting.   Genitourinary: Negative.  Negative for dysuria and urgency.   Musculoskeletal: Negative.  Negative for joint pain and myalgias.   Skin: Negative.  Negative for itching and rash.   Neurological: Negative.  Negative for dizziness, focal weakness, weakness and headaches.   Endo/Heme/Allergies: Negative.  Does not bruise/bleed easily.   Psychiatric/Behavioral: Negative.  Negative for depression. The patient is not nervous/anxious.        Physical Exam   Constitutional: She is oriented to person, place, and time. She appears well-developed and well-nourished.   HENT:   Head: Normocephalic and atraumatic.   Eyes: Conjunctivae are normal. Pupils are equal, round, and reactive to light.   Neck: Normal range of motion. Neck supple.   Cardiovascular: Normal rate and regular rhythm.    Pulmonary/Chest: Effort normal and breath sounds normal.   Abdominal: Soft. Bowel sounds are normal.   Musculoskeletal: Normal range of motion. She exhibits edema.   Neurological: She is alert and oriented to person, place, and time.   Skin: Skin is warm and dry.   Psychiatric: She has a normal mood and affect.     Hemodynamics:  Temp (24hrs), Av.5 °C (97.7 °F), Min:36.2 °C (97.2 °F), Max:36.7 °C (98.1 °F)  Temperature: 36.7 °C (98.1 °F)  Pulse  Av.9  Min: 64  Max: 93   Blood Pressure : 126/56      Respiratory:    Respiration: 18, Pulse Oximetry: 95 %, O2 Daily Delivery Respiratory : Room Air with O2 Available     Work Of Breathing / Effort: Mild  RUL Breath Sounds: Clear, RML Breath Sounds: Diminished, RLL Breath Sounds: Diminished, MANNY Breath Sounds: Clear, LLL Breath Sounds: Diminished  Fluids:     Weight: 75.2 kg (165 lb 12.6 oz)  GI/Nutrition:  Orders Placed This Encounter   Procedures   • DIET ORDER     Standing Status:   Standing     Number of Occurrences:   1     Order Specific Question:   Diet:     Answer:   Cardiac [6]     Lab Results:  Recent Labs      04/30/18   0715  04/30/18   1046  05/01/18   0328   WBC  4.6*  3.8*  5.5   RBC  4.45  3.80*  3.62*   HEMOGLOBIN  12.2  10.6*  10.0*   HEMATOCRIT  38.6  32.8*  31.4*   MCV  86.7  86.3  86.7   MCH  27.4  27.9  27.6   MCHC  31.6*  32.3*  31.8*   RDW  43.2  43.3  43.8   PLATELETCT  149*  112*  111*   MPV  12.1  12.8  11.9     Recent Labs      04/30/18   0715  04/30/18   1046  05/01/18   0328   SODIUM  136  136  133*   POTASSIUM  3.7  3.6  3.5*   CHLORIDE  103  104  102   CO2  25  25  25   GLUCOSE  119*  109*  106*   BUN  13  12  14   CREATININE  0.91  0.82  0.87   CALCIUM  9.4  8.2*  8.5     Recent Labs      04/30/18   0715  05/01/18   0328   INR  1.19*  1.22*     Recent Labs      04/30/18   0715  04/30/18   1046  05/01/18   0328   BNPBTYPENAT  384*  557*  638*     Recent Labs      04/30/18   0715  04/30/18   1046  05/01/18   0328   BNPBTYPENAT  384*  557*  638*         Medications reviewed.      Current Facility-Administered Medications:   •  pneumococcal vaccine (PNEUMOVAX-23) injection 25 mcg, 0.5 mL, Intramuscular, Once, Napoleon Pepe M.D.  •  [START ON 5/6/2018] alendronate (FOSAMAX) tablet 35 mg, 35 mg, Oral, Q7 DAYS, Poonam A Britany, A.P.R.N.  •  anastrozole (ARIMIDEX) tablet 1 mg, 1 mg, Oral, DAILY, OMAR Palacios.P.R.N., 1 mg at 05/01/18 0631  •  ascorbic acid tablet 500 mg, 500 mg, Oral, DAILY, OMAR Palacios.P.R.N., 500  mg at 05/01/18 0634  •  atorvastatin (LIPITOR) tablet 40 mg, 40 mg, Oral, Nightly, Poonam Garg, A.P.R.N., 40 mg at 04/30/18 1800  •  calcium carbonate (OS-JAMES 500) tablet 500 mg, 500 mg, Oral, DAILY, Poonam Garg A.P.R.N., 500 mg at 05/01/18 0632  •  cholecalciferol (VITAMIN D3) tablet 400 Units, 400 Units, Oral, BID, Poonam Garg A.P.R.N., 400 Units at 05/01/18 0631  •  clopidogrel (PLAVIX) tablet 75 mg, 75 mg, Oral, DAILY, Poonam Garg A.P.R.N., 75 mg at 05/01/18 0631  •  cyanocobalamin (VITAMIN B-12) tablet 500 mcg, 500 mcg, Oral, DAILY, Poonam Garg A.P.R.N., 500 mcg at 05/01/18 0633  •  levothyroxine (SYNTHROID) tablet 112 mcg, 112 mcg, Oral, AM ES, Poonam Garg, A.P.R.N., 112 mcg at 05/01/18 0547  •  metoprolol (LOPRESSOR) tablet 50 mg, 50 mg, Oral, BID, Poonam Garg, A.P.R.N., 50 mg at 05/01/18 0631  •  oxybutynin (DITROPAN) tablet 5 mg, 5 mg, Oral, BID, Poonam Garg, A.P.R.N., 5 mg at 05/01/18 0633  •  Respiratory Care per Protocol, , Nebulization, Continuous RT, Poonam Garg, A.P.R.N.  •  hydrALAZINE (APRESOLINE) injection 10 mg, 10 mg, Intravenous, Q30 MIN PRN, Poonam Garg, A.P.R.N.  •  acetaminophen (TYLENOL) tablet 650 mg, 650 mg, Oral, Q6HRS PRN, Poonam Garg A.P.R.N., 650 mg at 05/01/18 1106  •  diphenhydrAMINE (BENADRYL) tablet/capsule 25 mg, 25 mg, Oral, HS PRN, Poonam Garg, A.P.R.N.  •  ondansetron (ZOFRAN) syringe/vial injection 4 mg, 4 mg, Intravenous, Q4HRS PRN, OMAR Palacios.P.R.N., 4 mg at 04/30/18 1947  •  diphenhydrAMINE (BENADRYL) injection 25 mg, 25 mg, Intravenous, Q6HRS PRN, Poonam Garg, A.P.R.N.  •  furosemide (LASIX) injection 40 mg, 40 mg, Intravenous, BID DIURETIC, OMAR Palacios.P.R.N., 40 mg at 05/01/18 0551  •  MD ALERT... warfarin (COUMADIN) per pharmacy protocol, , Other, pharmacy to dose, Poonam Garg, OMAR.P.R.N.  •  warfarin (COUMADIN) tablet 6 mg, 6 mg,  Oral, COUMADIN-DAILY, Poonam Garg, A.P.R.N., 6 mg at 04/30/18 1800  •  potassium chloride SA (Kdur) tablet 20 mEq, 20 mEq, Oral, DAILY, Poonam Garg, A.P.R.N., 20 mEq at 05/01/18 0634  •  polyethylene glycol/lytes (MIRALAX) PACKET 1 Packet, 1 Packet, Oral, DAILY, Napoleon Pepe M.D., 1 Packet at 05/01/18 0622    CARDIAC STUDIES/PROCEDURES:    ANKLE BRACHIAL INDEX (05/01/18)  No evidence of arterial insufficiency.      CARDIAC CATHETERIZATION CONCLUSIONS (03/20/18)  1.  Multivessel coronary artery disease.  2.  Successful percutaneous transluminal coronary angioplasty/stent placement   of the proximal left anterior descending artery with 2.75x8 mm Synergy   drug-eluting stent.  3.  Successful percutaneous transluminal coronary angioplasty/stent placement   of the mid left anterior descending artery with 2.5x16 mm Synergy drug-eluting stent.  4.  Successful percutaneous transluminal coronary angioplasty/stent placement   of the proximal diagonal branch with 2.5x8 mm Synergy drug-eluting stent.  5.  Successful percutaneous transluminal coronary angioplasty/stent placement   of the ostial first obtuse marginal branch with 2.5x12 mm Synergy drug-eluting stent.     CARDIAC CATHETERIZATION CONCLUSIONS (03/13/18)  1.  Severe aortic stenosis with peak gradient of 100 mmHg, mean gradient of 85   mmHg, calculated JULES of 0.38 cm2.  2.  Severe 3-vessel coronary artery disease with high-grade proximal and mid   LAD stenosis, high-grade ostial proximal diagonal branch stenosis, high-grade   ostial proximal obtuse marginal branch stenosis, and chronic total occlusion   of the mid right coronary artery with distal vessel filling via left to right   collaterals.  3.  Normal left ventricular systolic function with ejection fraction of 60%.  4.  Elevated left ventricular end-diastolic pressure.  5.  Elevated right heart pressure with PA systolic pressure of 50 mmHg.  6.  Mildly dilated ascending aorta with heavily  calcified aortic valve.  7.  Heavily calcified descending abdominal aorta with luminal irregularities   of 30-40%, normal size bilateral iliofemoral system with tortuosity.     CAROTID ULTRASOUND (12/13/17)  Carotid ultrasound showing mild plaques.     CTA OF CHEST AND ABDOMEN (04/19/18)  1.  Severely calcified tricuspid aortic valve annulus measures 460 sq mm  2.  Coronary artery ostia are over 1 cm above the annulus  3.  Iliofemoral runoff calibers are at least 5.8 mm short axis left, 6.1 mm right  4.  Severe coronary artery disease    ECHOCARDIOGRAM CONCLUSIONS (05/01/18)  Normal left ventricular systolic function.  Left ventricular ejection fraction is visually estimated to be 55%.  Grade I diastolic dysfunction.  Moderate concentric left ventricular hypertrophy.  The right ventricle was normal in size and function.  Known TAVR aortic valve that is functioning normally with normal transvalvular gradients.   Vmax is   1.9 m/s. Transvalvular gradients are - Peak: 15 mmHg,  Mean: 8 mmHg.   No paravalvular leak is noted.  Moderate mitral annular calcification.  Normal inferior vena cava size and inspiratory collapse.  Right ventricular systolic pressure is estimated to be 37 mmHg.  Normal pericardium without effusion.    ECHOCARDIOGRAM CONCLUSIONS by Dr. Salcedo (04/30/18)  Results pending.     ECHOCARDIOGRAM CONCLUSIONS [facility] (11/28/17)  Echocardiogram showing low normal left ventricular systolic function, ejection fraction of 50%, severe aortic stenosis with peak velocity of 4.7 m/s, peak gradient of 91 mmHg, mean gradient of 57 mmHg and calculated JULES of 0.6-0.7 cm2, trace aortic regurgitation, mild mitral regurgitation, mild tricuspid regurgitation and estimated right ventricular systolic pressure of 33 mmHg.     EKG performed on (04/30/18) was reviewed: EKG shows sinus rhythm with left bundle branch block.  EKG performed on (04/30/18) EKG shows sinus rhythm with left bundle branch block.  EKG performed on  (03/20/18) EKG shows sinus rhythm.    LOWER EXTREMITY ARTERIAL ULTRASOUND (05/01/18)  There is no significant atherosclerotic plaque seen, flow velocities and waveforms are normal to the ankle.     LOWER EXTREMITY VENOUS ULTRASOUND (05/01/18)  No superficial or deep venous thrombosis.     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 Successful transcatheter aortic valve replacement (TAVR) with # 26 [place] Robbie S3 valve, transfemoral approach, under conscious sedation (04/30/18): She is clinically doing well.  2. Acute on chronic valvular diastolic congestive heart failure: The overall volume status has improved.  3. Coronary artery disease with stent placement: She is clinically doing well without recurrence of her angina. We will continue with current medical care.  4. Deep venous thrombosis in 12/10/17 at [facility] on (warfarin): Her right leg has developed swelling. Most of her test has returned. We have restarted warfarin. INR does not need to be therapeutic for discharge.    CC Tai Ramesh      Quality-Core Measures

## 2018-05-01 NOTE — PROGRESS NOTES
Monitor room called stating it appears pt is going in and out of an accelerated junctional rhythm. Pt asymptomatic. EKG ordered. Unable to capture rhythm changes on ekg at this time. Monitor strips printed and placed in pt chart showing changes. Cardiologist Kevin notified.

## 2018-05-02 VITALS
SYSTOLIC BLOOD PRESSURE: 104 MMHG | RESPIRATION RATE: 18 BRPM | TEMPERATURE: 98.8 F | WEIGHT: 166.01 LBS | HEART RATE: 72 BPM | BODY MASS INDEX: 27.66 KG/M2 | DIASTOLIC BLOOD PRESSURE: 58 MMHG | OXYGEN SATURATION: 96 % | HEIGHT: 65 IN

## 2018-05-02 LAB
ALBUMIN SERPL BCP-MCNC: 3.3 G/DL (ref 3.2–4.9)
ALBUMIN/GLOB SERPL: 1.4 G/DL
ALP SERPL-CCNC: 68 U/L (ref 30–99)
ALT SERPL-CCNC: 10 U/L (ref 2–50)
ANION GAP SERPL CALC-SCNC: 7 MMOL/L (ref 0–11.9)
AST SERPL-CCNC: 22 U/L (ref 12–45)
BILIRUB SERPL-MCNC: 0.8 MG/DL (ref 0.1–1.5)
BNP SERPL-MCNC: 458 PG/ML (ref 0–100)
BUN SERPL-MCNC: 17 MG/DL (ref 8–22)
CALCIUM SERPL-MCNC: 8.8 MG/DL (ref 8.5–10.5)
CHLORIDE SERPL-SCNC: 102 MMOL/L (ref 96–112)
CO2 SERPL-SCNC: 25 MMOL/L (ref 20–33)
CREAT SERPL-MCNC: 0.9 MG/DL (ref 0.5–1.4)
EKG IMPRESSION: NORMAL
ERYTHROCYTE [DISTWIDTH] IN BLOOD BY AUTOMATED COUNT: 42.5 FL (ref 35.9–50)
GLOBULIN SER CALC-MCNC: 2.3 G/DL (ref 1.9–3.5)
GLUCOSE SERPL-MCNC: 108 MG/DL (ref 65–99)
HCT VFR BLD AUTO: 30.2 % (ref 37–47)
HGB BLD-MCNC: 10.1 G/DL (ref 12–16)
INR PPP: 1.43 (ref 0.87–1.13)
MCH RBC QN AUTO: 28.2 PG (ref 27–33)
MCHC RBC AUTO-ENTMCNC: 33.4 G/DL (ref 33.6–35)
MCV RBC AUTO: 84.4 FL (ref 81.4–97.8)
PLATELET # BLD AUTO: 109 K/UL (ref 164–446)
PMV BLD AUTO: 12.9 FL (ref 9–12.9)
POTASSIUM SERPL-SCNC: 3.8 MMOL/L (ref 3.6–5.5)
PROT SERPL-MCNC: 5.6 G/DL (ref 6–8.2)
PROTHROMBIN TIME: 17.1 SEC (ref 12–14.6)
RBC # BLD AUTO: 3.58 M/UL (ref 4.2–5.4)
SODIUM SERPL-SCNC: 134 MMOL/L (ref 135–145)
WBC # BLD AUTO: 7.9 K/UL (ref 4.8–10.8)

## 2018-05-02 PROCEDURE — 700102 HCHG RX REV CODE 250 W/ 637 OVERRIDE(OP): Performed by: INTERNAL MEDICINE

## 2018-05-02 PROCEDURE — 83880 ASSAY OF NATRIURETIC PEPTIDE: CPT

## 2018-05-02 PROCEDURE — 85027 COMPLETE CBC AUTOMATED: CPT

## 2018-05-02 PROCEDURE — 80053 COMPREHEN METABOLIC PANEL: CPT

## 2018-05-02 PROCEDURE — 93010 ELECTROCARDIOGRAM REPORT: CPT | Performed by: INTERNAL MEDICINE

## 2018-05-02 PROCEDURE — 700111 HCHG RX REV CODE 636 W/ 250 OVERRIDE (IP): Performed by: NURSE PRACTITIONER

## 2018-05-02 PROCEDURE — A9270 NON-COVERED ITEM OR SERVICE: HCPCS | Performed by: INTERNAL MEDICINE

## 2018-05-02 PROCEDURE — A9270 NON-COVERED ITEM OR SERVICE: HCPCS | Performed by: NURSE PRACTITIONER

## 2018-05-02 PROCEDURE — 93005 ELECTROCARDIOGRAM TRACING: CPT | Performed by: NURSE PRACTITIONER

## 2018-05-02 PROCEDURE — 36415 COLL VENOUS BLD VENIPUNCTURE: CPT

## 2018-05-02 PROCEDURE — 700102 HCHG RX REV CODE 250 W/ 637 OVERRIDE(OP): Performed by: NURSE PRACTITIONER

## 2018-05-02 PROCEDURE — 85610 PROTHROMBIN TIME: CPT

## 2018-05-02 RX ORDER — WARFARIN SODIUM 6 MG/1
6 TABLET ORAL
Qty: 30 TAB | Refills: 0 | Status: SHIPPED | OUTPATIENT
Start: 2018-05-02 | End: 2018-08-08

## 2018-05-02 RX ORDER — PANTOPRAZOLE SODIUM 40 MG/1
40 TABLET, DELAYED RELEASE ORAL DAILY
Qty: 30 TAB | Refills: 0 | Status: SHIPPED | OUTPATIENT
Start: 2018-05-02

## 2018-05-02 RX ORDER — FUROSEMIDE 40 MG/1
40 TABLET ORAL 2 TIMES DAILY
Qty: 60 TAB | Refills: 11 | Status: SHIPPED | OUTPATIENT
Start: 2018-05-02

## 2018-05-02 RX ADMIN — CLOPIDOGREL 75 MG: 75 TABLET, FILM COATED ORAL at 05:50

## 2018-05-02 RX ADMIN — OXYCODONE HYDROCHLORIDE AND ACETAMINOPHEN 500 MG: 500 TABLET ORAL at 05:51

## 2018-05-02 RX ADMIN — Medication 500 MG: at 05:51

## 2018-05-02 RX ADMIN — LEVOTHYROXINE SODIUM 112 MCG: 112 TABLET ORAL at 05:10

## 2018-05-02 RX ADMIN — POTASSIUM CHLORIDE 20 MEQ: 1500 TABLET, EXTENDED RELEASE ORAL at 05:50

## 2018-05-02 RX ADMIN — Medication 500 MCG: at 05:50

## 2018-05-02 RX ADMIN — ACETAMINOPHEN 650 MG: 325 TABLET, FILM COATED ORAL at 09:08

## 2018-05-02 RX ADMIN — POLYETHYLENE GLYCOL 3350 1 PACKET: 17 POWDER, FOR SOLUTION ORAL at 05:46

## 2018-05-02 RX ADMIN — METOPROLOL TARTRATE 50 MG: 50 TABLET, FILM COATED ORAL at 05:50

## 2018-05-02 RX ADMIN — OXYBUTYNIN CHLORIDE 5 MG: 5 TABLET ORAL at 05:51

## 2018-05-02 RX ADMIN — ANASTROZOLE 1 MG: 1 TABLET, FILM COATED ORAL at 05:50

## 2018-05-02 RX ADMIN — FUROSEMIDE 40 MG: 10 INJECTION, SOLUTION INTRAMUSCULAR; INTRAVENOUS at 05:47

## 2018-05-02 RX ADMIN — CHOLECALCIFEROL TAB 10 MCG (400 UNIT) 400 UNITS: 10 TAB at 05:49

## 2018-05-02 ASSESSMENT — ENCOUNTER SYMPTOMS
EYES NEGATIVE: 1
FEVER: 0
MUSCULOSKELETAL NEGATIVE: 1
WEIGHT LOSS: 0
CONSTITUTIONAL NEGATIVE: 1
DIZZINESS: 0
BRUISES/BLEEDS EASILY: 0
VOMITING: 0
NERVOUS/ANXIOUS: 0
FOCAL WEAKNESS: 0
COUGH: 0
RESPIRATORY NEGATIVE: 1
HEADACHES: 0
DEPRESSION: 0
NAUSEA: 0
CHILLS: 0
GASTROINTESTINAL NEGATIVE: 1
BLURRED VISION: 0
MYALGIAS: 0
PSYCHIATRIC NEGATIVE: 1
PALPITATIONS: 0
WEAKNESS: 0
SHORTNESS OF BREATH: 0
ABDOMINAL PAIN: 0
NEUROLOGICAL NEGATIVE: 1
DOUBLE VISION: 0
CLAUDICATION: 0

## 2018-05-02 ASSESSMENT — PAIN SCALES - GENERAL
PAINLEVEL_OUTOF10: 3
PAINLEVEL_OUTOF10: 0
PAINLEVEL_OUTOF10: 0
PAINLEVEL_OUTOF10: 4

## 2018-05-02 NOTE — PROGRESS NOTES
Pt resting comfortably in bed. Denies any needs at this time. Bed locked and in lowest position. Call light within reach. Hourly rounding in place.

## 2018-05-02 NOTE — DISCHARGE SUMMARY
PRIMARY DISCHARGE DIAGNOSIS: Status post transcatheter aortic valve replacement.    PROCEDURES:    1. Successful transcatheter aortic valve replacement (TAVR) with #26 [place] Robbie 3 valve, transfemoral approach under conscious sedation on 18  2. Intraoperative transesophageal echocardiogram showing CONCLUSIONS  1)  Severe AS  2)  26 mm Robbie 3  EOA:  2.9 cm^2 Mean Gradient:  9 mmHG  3)  No PVL  3. Echocardiogram on 18 showing:  Normal left ventricular systolic function.  Left ventricular ejection fraction is visually estimated to be 55%.  Grade I diastolic dysfunction.  Moderate concentric left ventricular hypertrophy.  The right ventricle was normal in size and function.  Known TAVR aortic valve that is functioning normally with normal   transvalvular gradients.  No paravalvular leak is noted.  Moderate mitral annular calcification.  Normal inferior vena cava size and inspiratory collapse.  Right ventricular systolic pressure is estimated to be 37 mmHg.  Normal pericardium without effusion.  Compared to the previous study performed on 2018: The aortic valve   has been replaced.   4. CXR on 18 showin.  No acute cardiopulmonary disease.  2.  Trace bilateral pleural effusions  3.  Atherosclerosis  5. EKG on 18 showing SINUS RHYTHM   LEFT BUNDLE BRANCH BLOCK    HOSPITAL COURSE: The patient is a pleasant 82 year old male with severe symptomatic aortic stenosis, acute on chronic diastolic heart failure due to valvular disease, coronary artery disease with prior stent placement, prior DVT on coumadin therapy, HTN, and HLD. Due to the patient's symptoms, the patient underwent successful TAVR described as above. Post-procedure, the patient did well. They did require IV diuresis during their stay and will continue on oral diuretics post-operatively. They were able to ambulate but continued to have mild R leg pain behind her knee, it is very localized to one small area, no diffuse pain or  swelling noted on discharge. This was the same leg as her prior DVT, we obtained further venous and arterial duplexes that were both unremarkable.They are now off oxygen and are to be discharged to home with her brother back to [place], [place].    DISCHARGE MEDICATIONS: The patient is to be discharged on new medications including protonix 40 mg QD for acid reflux-she will follow up with her primary care provider for further refills. She will increase her furosemide 40 mg BID. They will continue their prior home medications of plavix 75 mg QD, fosamax 35 mg Q& days, arimidex 1 mg QD, ascorbic acid 500 mg QD, lipitor 40 mg QD, calcium carbonate 600 mg QD, vitamin D 400 Units QD, cyanocobalamin 5000 mcg QD, benadryl 25 mg QD PRN for itching, levothyroxine 112 mcg QD, lutein 40 mg QD, magnesium citrate QD, metoprolol 50 mg BID QD, oxybutynin 5 mg BID, potassium 20 mEq QD, probiotic QD, zantac 150 mg QD, zinc 25 mg QD, and coumadin 6 mg QD (until her INR check on Friday 5/4). They are to stop ASA 81 mg QD.    DISCHARGE INSTRUCTIONS: They are given discharge instructions on potential post-operative complications and symptoms to watch out for. Their groin sites were checked and were clean, dry, and intact. Patient or family to notify us for any complications noted on the discharge instructions. They will follow up with myself, VAIBHAV Humphreys, on Monday in our cardiology office. They will get labs before their follow up appointment. They will then follow up with Dr. Pepe with a repeat echocardiogram in one month for post TAVR assessment.

## 2018-05-02 NOTE — HEART FAILURE PROGRAM
Cardiovascular Nurse Navigator () Advanced Heart Failure Program Inpatient Progress Note:    Acutely decompensated HFpEF (55%). IV diuresis ongoing, Valve Program following, outpatient appointment is with Valve Program which is appropriate. Does not need to be seen at [facility].       [facility] Plan Notes:   None   Therapy Notes:   PT completed phase I cardiac rehab  Insurance Coverage:  Medicare A&B  Patient Residence:  [place]  Advanced Care Planning:  No AD on file. Full code status at the time of this note's filing.      Follow up appointment:   Unless discharged under the care of Hospice, pt must have an appointment scheduled within 7 days of discharge (Cardiology, PCP, or DC Clinic).  May 04, 2018  1:00 PM PDT  Anti-Coag Routine with [place] PHARMACIST  [facility] [address]  [place] [address] [phone]   May 07, 2018  2:20 PM PDT  Valve Established Patient with GIOVANNI Palacios  [facility] (--) [address], [address]  [place] [address] [phone]       Bedside Nursing Education:  Please provide HF packet and repeated, ongoing education while administering medications, weighing patient, discussing management of symptoms, diet and need to follow up and act on changes.     Bedside Nursing Weights:  Please weigh patient daily (standing scale if not clinically contraindicated) and have her write weight on HF Calendar. RN/CNA to document weight in Epic.    Please assess pt's understanding of what to do if weight gain occurs. Reinforce education to act (call telephone number on their HF Book) if they gain 3 or more pounds in a 24 hr period or 5 or more pounds in a week.    If pt does not own a working scale and cannot afford to purchase one, please provide one. Scales can be found in the Care Coordination Rooms on T-7&T-8.    Bedside Nursing Discharge:  When completing the after visit summary (discharge  "instructions) please select \"Cardiac Diagnosis, and Heart Failure\" in the special instructions section to populate the heart failure specific discharge instructions.     Referrals Placed:    Social Work   Please assess transportation and copay resources for: acquisition of medications and attendance at follow up appointments.  Registered Dietician  To provide education on HF diet.    Thank you and please call JOS Cedillo with any questions: [phone].   "

## 2018-05-02 NOTE — DISCHARGE INSTRUCTIONS
Discharge Instructions    Discharged to home by car with relative. Discharged via wheelchair, hospital escort: Yes.  Special equipment needed: Not Applicable    Be sure to schedule a follow-up appointment with your primary care doctor or any specialists as instructed.     Discharge Plan:   Influenza Vaccine Indication: Not indicated: Previously immunized this influenza season and > 8 years of age    I understand that a diet low in cholesterol, fat, and sodium is recommended for good health. Unless I have been given specific instructions below for another diet, I accept this instruction as my diet prescription.   Other diet:     Special Instructions:   Remove all dressings on discharge. Follow up in our office on Friday. Resume medications per medication reconciliation. Lab work needed prior to apt. Groin site care. Watch for oozing, green/yellow discharge from groin site, or abnormal bruising or swelling. Watch for dizziness/lightheadedness at home. No driving for one week. Take it easy for 2 weeks with no heavy lifting or heavy exertional activities. Please call our office for any questions/concerns [phone].    Transcatheter Aortic Valve Replacement (TAVR)    General Instructions:  1. Take Medication as directed.  You will likely need to take aspirin and another blood thinner (antiplatelet medication) for a period.  You'll need to take the aspirin for the rest of your life.  Be sure your doctor knows about all other medicines you take, including over-the-counter medicines and dietary supplements.    Incision Care Instructions:  1. Look and feel the site(s) of your TAVR TODAY so you can recognize changes that should be called to your doctor (see below).  2. It's normal for your incision to be bruised, itchy, or sore while it's healing.  Your incision may take a week or more to heal.  3. Bruising may occur.  Some of the discoloration may travel down the leg.  As it resolves, the color should change from blue to  green to yellow-brown.  4. A small, round lump under the TAVR site may remain for up to 6 weeks.  5. Wash your incision site every day with warm water and soap.  Gently pat it dry.  Don't put powder, lotion, or ointment on the incision until it's healed.    Activity:  1. No driving for one week  2. If you must take a long car ride (not as a ), stop every hour and walk around the car.  3. No lifting or pulling objects over 5 pounds for 4-5 days.  4. Warm showers or baths are permitted after the bandage is removed.  5. Walk regularly.  One of the best ways to get stronger is to walk.  Walk a little more each day.  Take someone with you until you feel OK to walk alone.    When to call your health care provider:  1. You develop a fever.  2. Redness, swelling, bleeding, warmth, or fluid draining at the incision site.  3. Bruising appears to be new or does not look like it is getting better.  4. The small, round lump in the groin in the area of the TAVR site increases in size.    Seek immediate medical help if you have any of the following symptoms:  1. You experience any leg numbness, aching, or discomfort  2. Chest pain or trouble breathing (call 911)  3. Sudden numbness or weakness in your face, arms, or legs (call 911)  4. Bowel movement that is bright red  5. Dizziness or fainting  6. Weight gain of more than 2 pounds in 24 hours or more than 5 pounds in 1 week  7. Swelling in your hands, feet, or ankles  8. Shortness of breath that doesn't get better when you rest  9. Pain that gets worse or doesn't go away  10. Fast or irregular pulse       · Is patient discharged on Warfarin / Coumadin?   Yes    You are receiving the drug warfarin. Please understand the importance of monitoring warfarin with scheduled PT/INR blood draws.  Follow-up with a call to your personal Doctor's office in 3 days to schedule a PT/INR. .    IMPORTANT: HOW TO USE THIS INFORMATION:  This is a summary and does NOT have all possible  "information about this product. This information does not assure that this product is safe, effective, or appropriate for you. This information is not individual medical advice and does not substitute for the advice of your health care professional. Always ask your health care professional for complete information about this product and your specific health needs.      WARFARIN - ORAL (WARF-uh-rin)      COMMON BRAND NAME(S): Coumadin      WARNING:  Warfarin can cause very serious (possibly fatal) bleeding. This is more likely to occur when you first start taking this medication or if you take too much warfarin. To decrease your risk for bleeding, your doctor or other health care provider will monitor you closely and check your lab results (INR test) to make sure you are not taking too much warfarin. Keep all medical and laboratory appointments. Tell your doctor right away if you notice any signs of serious bleeding. See also Side Effects section.      USES:  This medication is used to treat blood clots (such as in deep vein thrombosis-DVT or pulmonary embolus-PE) and/or to prevent new clots from forming in your body. Preventing harmful blood clots helps to reduce the risk of a stroke or heart attack. Conditions that increase your risk of developing blood clots include a certain type of irregular heart rhythm (atrial fibrillation), heart valve replacement, recent heart attack, and certain surgeries (such as hip/knee replacement). Warfarin is commonly called a \"blood thinner,\" but the more correct term is \"anticoagulant.\" It helps to keep blood flowing smoothly in your body by decreasing the amount of certain substances (clotting proteins) in your blood.      HOW TO USE:  Read the Medication Guide provided by your pharmacist before you start taking warfarin and each time you get a refill. If you have any questions, ask your doctor or pharmacist. Take this medication by mouth with or without food as directed by your " doctor or other health care professional, usually once a day. It is very important to take it exactly as directed. Do not increase the dose, take it more frequently, or stop using it unless directed by your doctor. Dosage is based on your medical condition, laboratory tests (such as INR), and response to treatment. Your doctor or other health care provider will monitor you closely while you are taking this medication to determine the right dose for you. Use this medication regularly to get the most benefit from it. To help you remember, take it at the same time each day. It is important to eat a balanced, consistent diet while taking warfarin. Some foods can affect how warfarin works in your body and may affect your treatment and dose. Avoid sudden large increases or decreases in your intake of foods high in vitamin K (such as broccoli, cauliflower, cabbage, brussels sprouts, kale, spinach, and other green leafy vegetables, liver, green tea, certain vitamin supplements). If you are trying to lose weight, check with your doctor before you try to go on a diet. Cranberry products may also affect how your warfarin works. Limit the amount of cranberry juice (16 ounces/480 milliliters a day) or other cranberry products you may drink or eat.      SIDE EFFECTS:  Nausea, loss of appetite, or stomach/abdominal pain may occur. If any of these effects persist or worsen, tell your doctor or pharmacist promptly. Remember that your doctor has prescribed this medication because he or she has judged that the benefit to you is greater than the risk of side effects. Many people using this medication do not have serious side effects. This medication can cause serious bleeding if it affects your blood clotting proteins too much (shown by unusually high INR lab results). Even if your doctor stops your medication, this risk of bleeding can continue for up to a week. Tell your doctor right away if you have any signs of serious bleeding,  including: unusual pain/swelling/discomfort, unusual/easy bruising, prolonged bleeding from cuts or gums, persistent/frequent nosebleeds, unusually heavy/prolonged menstrual flow, pink/dark urine, coughing up blood, vomit that is bloody or looks like coffee grounds, severe headache, dizziness/fainting, unusual or persistent tiredness/weakness, bloody/black/tarry stools, chest pain, shortness of breath, difficulty swallowing. Tell your doctor right away if any of these unlikely but serious side effects occur: persistent nausea/vomiting, severe stomach/abdominal pain, yellowing eyes/skin. This drug rarely has caused very serious (possibly fatal) problems if its effects lead to small blood clots (usually at the beginning of treatment). This can lead to severe skin/tissue damage that may require surgery or amputation if left untreated. Patients with certain blood conditions (protein C or S deficiency) may be at greater risk. Get medical help right away if any of these rare but serious side effects occur: painful/red/purplish patches on the skin (such as on the toe, breast, abdomen), change in the amount of urine, vision changes, confusion, slurred speech, weakness on one side of the body. A very serious allergic reaction to this drug is rare. However, get medical help right away if you notice any symptoms of a serious allergic reaction, including: rash, itching/swelling (especially of the face/tongue/throat), severe dizziness, trouble breathing. This is not a complete list of possible side effects. If you notice other effects not listed above, contact your doctor or pharmacist. In the [place] - Call your doctor for medical advice about side effects. You may report side effects to FDA at 3-399-LJQ-0032. In [place] - Call your doctor for medical advice about side effects. You may report side effects to [facility] [place] at 1-572.121.1497.      PRECAUTIONS:  Before taking warfarin, tell your doctor or pharmacist if you are allergic  to it; or if you have any other allergies. This product may contain inactive ingredients, which can cause allergic reactions or other problems. Talk to your pharmacist for more details. Before using this medication, tell your doctor or pharmacist your medical history, especially of: blood disorders (such as anemia, hemophilia), bleeding problems (such as bleeding of the stomach/intestines, bleeding in the brain), blood vessel disorders (such as aneurysms), recent major injury/surgery, liver disease, alcohol use, mental/mood disorders (including memory problems), frequent falls/injuries. It is important that all your doctors and dentists know that you take warfarin. Before having surgery or any medical/dental procedures, tell your doctor or dentist that you are taking this medication and about all the products you use (including prescription drugs, nonprescription drugs, and herbal products). Avoid getting injections into the muscles. If you must have an injection into a muscle (for example, a flu shot), it should be given in the arm. This way, it will be easier to check for bleeding and/or apply pressure bandages. This medication may cause stomach bleeding. Daily use of alcohol while using this medicine will increase your risk for stomach bleeding and may also affect how this medication works. Limit or avoid alcoholic beverages. If you have not been eating well, if you have an illness or infection that causes fever, vomiting, or diarrhea for more than 2 days, or if you start using any antibiotic medications, contact your doctor or pharmacist immediately because these conditions can affect how warfarin works. This medication can cause heavy bleeding. To lower the chance of getting cut, bruised, or injured, use great caution with sharp objects like safety razors and nail cutters. Use an electric razor when shaving and a soft toothbrush when brushing your teeth. Avoid activities such as contact sports. If you fall or  "injure yourself, especially if you hit your head, call your doctor immediately. Your doctor may need to check you. The Food & Drug Administration has stated that generic warfarin products are interchangeable. However, consult your doctor or pharmacist before switching warfarin products. Be careful not to take more than one medication that contains warfarin unless specifically directed by the doctor or health care provider who is monitoring your warfarin treatment. Older adults may be at greater risk for bleeding while using this drug. This medication is not recommended for use during pregnancy because of serious (possibly fatal) harm to an unborn baby. Discuss the use of reliable forms of birth control with your doctor. If you become pregnant or think you may be pregnant, tell your doctor immediately. If you are planning pregnancy, discuss a plan for managing your condition with your doctor before you become pregnant. Your doctor may switch the type of medication you use during pregnancy. Very small amounts of this medication may pass into breast milk but is unlikely to harm a nursing infant. Consult your doctor before breast-feeding.      DRUG INTERACTIONS:  Drug interactions may change how your medications work or increase your risk for serious side effects. This document does not contain all possible drug interactions. Keep a list of all the products you use (including prescription/nonprescription drugs and herbal products) and share it with your doctor and pharmacist. Do not start, stop, or change the dosage of any medicines without your doctor's approval. Warfarin interacts with many prescription, nonprescription, vitamin, and herbal products. This includes medications that are applied to the skin or inside the vagina or rectum. The interactions with warfarin usually result in an increase or decrease in the \"blood-thinning\" (anticoagulant) effect. Your doctor or other health care professional should closely " monitor you to prevent serious bleeding or clotting problems. While taking warfarin, it is very important to tell your doctor or pharmacist of any changes in medications, vitamins, or herbal products that you are taking. Some products that may interact with this drug include: capecitabine, imatinib, mifepristone. Aspirin, aspirin-like drugs (salicylates), and nonsteroidal anti-inflammatory drugs (NSAIDs such as ibuprofen, naproxen, celecoxib) may have effects similar to warfarin. These drugs may increase the risk of bleeding problems if taken during treatment with warfarin. Carefully check all prescription/nonprescription product labels (including drugs applied to the skin such as pain-relieving creams) since the products may contain NSAIDs or salicylates. Talk to your doctor about using a different medication (such as acetaminophen) to treat pain/fever. Low-dose aspirin and related drugs (such as clopidogrel, ticlopidine) should be continued if prescribed by your doctor for specific medical reasons such as heart attack or stroke prevention. Consult your doctor or pharmacist for more details. Many herbal products interact with warfarin. Tell your doctor before taking any herbal products, especially bromelains, coenzyme Q10, cranberry, danshen, dong quai, fenugreek, garlic, ginkgo biloba, ginseng, and Marc's wort, among others. This medication may interfere with a certain laboratory test to measure theophylline levels, possibly causing false test results. Make sure laboratory personnel and all your doctors know you use this drug.      OVERDOSE:  If overdose is suspected, contact a poison control center or emergency room immediately. [place] residents can call the [place] National Poison Hotline at 1-448.841.5656. [place] residents can call a provincial poison control center. Symptoms of overdose may include: bloody/black/tarry stools, pink/dark urine, unusual/prolonged bleeding.      NOTES:  Do not share this medication  with others. Laboratory and/or medical tests (such as INR, complete blood count) must be performed periodically to monitor your progress or check for side effects. Consult your doctor for more details.      MISSED DOSE:  For the best possible benefit, do not miss any doses. If you do miss a dose and remember on the same day, take it as soon as you remember. If you remember on the next day, skip the missed dose and resume your usual dosing schedule. Do not double the dose to catch up because this could increase your risk for bleeding. Keep a record of missed doses to give to your doctor or pharmacist. Contact your doctor or pharmacist if you miss 2 or more doses in a row.      STORAGE:  Store at room temperature away from light and moisture. Do not store in the bathroom. Keep all medications away from children and pets. Do not flush medications down the toilet or pour them into a drain unless instructed to do so. Properly discard this product when it is  or no longer needed. Consult your pharmacist or local waste disposal company for more details about how to safely discard your product.      MEDICAL ALERT:  Your condition and medication can cause complications in a medical emergency. For information about enrolling in MedicAlert, call 1-404.370.1045 ([place]) or 1-109.511.2843 ([place]).      Information last revised 2010 Copyright(c) 2010 [employer]             Depression / Suicide Risk    As you are discharged from this [facility] facility, it is important to learn how to keep safe from harming yourself.    Recognize the warning signs:  · Abrupt changes in personality, positive or negative- including increase in energy   · Giving away possessions  · Change in eating patterns- significant weight changes-  positive or negative  · Change in sleeping patterns- unable to sleep or sleeping all the time   · Unwillingness or inability to communicate  · Depression  · Unusual sadness, discouragement  and loneliness  · Talk of wanting to die  · Neglect of personal appearance   · Rebelliousness- reckless behavior  · Withdrawal from people/activities they love  · Confusion- inability to concentrate     If you or a loved one observes any of these behaviors or has concerns about self-harm, here's what you can do:  · Talk about it- your feelings and reasons for harming yourself  · Remove any means that you might use to hurt yourself (examples: pills, rope, extension cords, firearm)  · Get professional help from the community (Mental Health, Substance Abuse, psychological counseling)  · Do not be alone:Call your Safe Contact- someone whom you trust who will be there for you.  · Call your local CRISIS HOTLINE 884-9067 or 735-110-1909  · Call your local Children's Mobile Crisis Response Team [place] (632) 305-4443 or www.[employer]  · Call the toll free National Suicide Prevention Hotlines   · National Suicide Prevention Lifeline 935-327-THBO (4484)  · [place] [facility] Network 800-SUICIDE (438-5371)              Special Instructions:   [facility] Patient Discharge Instructions  · Monitor your weight daily, and maintain a weight chart, to track your weight changes.   · Activity as tolerated, unless your Doctor has ordered otherwise. Other activity order: Activity as discussed.  · Follow a low fat, low cholesterol, low salt diet unless instructed otherwise by your Doctor. Read the labels on the back of food products and track your intake of fat, cholesterol and salt.   · Fluid Restriction No. If a Fluid Restriction has been ordered by your Doctor, measure fluids with a measuring cup to ensure that you are not exceeding the restriction.   · No smoking.  · Oxygen No. If your Doctor has ordered that you wear Oxygen at home, it is important to wear it as ordered.  · Did you receive an explanation from staff on the importance of taking each of your medications and why it is necessary to keep taking them unless your doctor  says to stop? Yes  · Were all of your questions answered about how to manage your heart failure and what to do if you have increased signs and symptoms after you go home? Yes  · Do you feel like your heart failure care team involved you in the care treatment plan and allowed you to make decisions regarding your care while in the hospital and addressed any discharge needs you might have? Yes    See the educational handout provided at discharge for more information on monitoring your daily weight, activity and diet. This also explains more about Heart Failure, symptoms of a flare-up and some of the tests that you have undergone.     Warning Signs of a Flare-Up include:  · Swelling in the ankles or lower legs.  · Shortness of breath, while at rest, or while doing normal activities.   · Shortness of breath at night when in bed, or coughing in bed.   · Requiring more pillows to sleep at night, or needing to sit up at night to sleep.  · Feeling weak, dizzy or fatigued.     When to call your Doctor:  · [facility] seven days a week from 8:00 a.m. to 8:00 p.m. for medical questions [phone].  · Call your Primary Care Physician or Cardiologist if:   1. You experience any pain radiating to your jaw or neck.  2. You have any difficulty breathing.  3. You experience weight gain of 3 lbs in a day or 5 lbs in a week.   4. You feel any palpitations or irregular heartbeats.  5. You become dizzy or lose consciousness.   If you have had an angiogram or had a pacemaker or AICD placed, and experience:  1. Bleeding, drainage or swelling at the surgical / puncture site.  2. Fever greater than 100.0 F  3. Shock from internal defibrillator.  4. Cool and / or numb extremities.

## 2018-05-02 NOTE — PROGRESS NOTES
Cardiology Progress Note               Author: Napoleon Pepe Date & Time created: 2018  8:10 AM     Interval History/Chief Complaint:  82 year old female status post TAVR.    Review of Systems   Constitutional: Negative.  Negative for chills, fever, malaise/fatigue and weight loss.   HENT: Negative.  Negative for hearing loss.    Eyes: Negative.  Negative for blurred vision and double vision.   Respiratory: Negative.  Negative for cough and shortness of breath.    Cardiovascular: Positive for leg swelling. Negative for chest pain, palpitations and claudication.   Gastrointestinal: Negative.  Negative for abdominal pain, nausea and vomiting.   Genitourinary: Negative.  Negative for dysuria and urgency.   Musculoskeletal: Negative.  Negative for joint pain and myalgias.   Skin: Negative.  Negative for itching and rash.   Neurological: Negative.  Negative for dizziness, focal weakness, weakness and headaches.   Endo/Heme/Allergies: Negative.  Does not bruise/bleed easily.   Psychiatric/Behavioral: Negative.  Negative for depression. The patient is not nervous/anxious.        Physical Exam   Constitutional: She is oriented to person, place, and time. She appears well-developed and well-nourished.   HENT:   Head: Normocephalic and atraumatic.   Eyes: Conjunctivae are normal. Pupils are equal, round, and reactive to light.   Neck: Normal range of motion. Neck supple.   Cardiovascular: Normal rate and regular rhythm.    Pulmonary/Chest: Effort normal and breath sounds normal.   Abdominal: Soft. Bowel sounds are normal.   Musculoskeletal: Normal range of motion. She exhibits edema.   Neurological: She is alert and oriented to person, place, and time.   Skin: Skin is warm and dry.   Psychiatric: She has a normal mood and affect.     Hemodynamics:  Temp (24hrs), Av.9 °C (98.4 °F), Min:36.6 °C (97.9 °F), Max:37.3 °C (99.1 °F)  Temperature: 37.3 °C (99.1 °F)  Pulse  Av.7  Min: 64  Max: 93   Blood Pressure : 101/53      Respiratory:    Respiration: 16, Pulse Oximetry: 96 %, O2 Daily Delivery Respiratory : Room Air with O2 Available     Work Of Breathing / Effort: Mild  RUL Breath Sounds: Clear, RML Breath Sounds: Diminished, RLL Breath Sounds: Diminished, MANNY Breath Sounds: Clear, LLL Breath Sounds: Diminished  Fluids:     Weight: 75.3 kg (166 lb 0.1 oz)  GI/Nutrition:  Orders Placed This Encounter   Procedures   • DIET ORDER     Standing Status:   Standing     Number of Occurrences:   1     Order Specific Question:   Diet:     Answer:   Cardiac [6]     Lab Results:  Recent Labs      04/30/18   1046  05/01/18   0328  05/02/18   0232   WBC  3.8*  5.5  7.9   RBC  3.80*  3.62*  3.58*   HEMOGLOBIN  10.6*  10.0*  10.1*   HEMATOCRIT  32.8*  31.4*  30.2*   MCV  86.3  86.7  84.4   MCH  27.9  27.6  28.2   MCHC  32.3*  31.8*  33.4*   RDW  43.3  43.8  42.5   PLATELETCT  112*  111*  109*   MPV  12.8  11.9  12.9     Recent Labs      04/30/18   1046  05/01/18   0328  05/02/18   0232   SODIUM  136  133*  134*   POTASSIUM  3.6  3.5*  3.8   CHLORIDE  104  102  102   CO2  25  25  25   GLUCOSE  109*  106*  108*   BUN  12  14  17   CREATININE  0.82  0.87  0.90   CALCIUM  8.2*  8.5  8.8     Recent Labs      04/30/18   0715  05/01/18   0328  05/02/18   0232   INR  1.19*  1.22*  1.43*     Recent Labs      04/30/18   1046  05/01/18   0328  05/02/18   0232   BNPBTYPENAT  557*  638*  458*     Recent Labs      04/30/18   1046  05/01/18   0328  05/02/18   0232   BNPBTYPENAT  557*  638*  458*         Medications reviewed.      Current Facility-Administered Medications:   •  [START ON 5/6/2018] alendronate (FOSAMAX) tablet 35 mg, 35 mg, Oral, Q7 DAYS, Poonam Garg, A.P.R.N.  •  anastrozole (ARIMIDEX) tablet 1 mg, 1 mg, Oral, DAILY, Poonam Garg, OMAR.P.R.N., 1 mg at 05/02/18 0550  •  ascorbic acid tablet 500 mg, 500 mg, Oral, DAILY, OMAR Palacios.P.R.N., 500 mg at 05/02/18 0551  •  atorvastatin (LIPITOR) tablet 40 mg, 40 mg, Oral,  Nightly, Poonam Garg A.P.R.N., 40 mg at 05/01/18 1949  •  calcium carbonate (OS-JAMES 500) tablet 500 mg, 500 mg, Oral, DAILY, Poonam Garg A.P.R.N., 500 mg at 05/02/18 0551  •  cholecalciferol (VITAMIN D3) tablet 400 Units, 400 Units, Oral, BID, Poonam Garg A.P.R.N., 400 Units at 05/02/18 0549  •  clopidogrel (PLAVIX) tablet 75 mg, 75 mg, Oral, DAILY, Poonam Garg A.P.R.N., 75 mg at 05/02/18 0550  •  cyanocobalamin (VITAMIN B-12) tablet 500 mcg, 500 mcg, Oral, DAILY, Poonam Garg A.P.R.N., 500 mcg at 05/02/18 0550  •  levothyroxine (SYNTHROID) tablet 112 mcg, 112 mcg, Oral, AM ES, Poonam Garg A.P.R.N., 112 mcg at 05/02/18 0510  •  metoprolol (LOPRESSOR) tablet 50 mg, 50 mg, Oral, BID, Poonam Garg A.P.R.N., 50 mg at 05/02/18 0550  •  oxybutynin (DITROPAN) tablet 5 mg, 5 mg, Oral, BID, Poonam Garg, A.P.R.N., 5 mg at 05/02/18 0551  •  Respiratory Care per Protocol, , Nebulization, Continuous RT, Poonam Garg A.P.R.N.  •  hydrALAZINE (APRESOLINE) injection 10 mg, 10 mg, Intravenous, Q30 MIN PRN, Poonam Garg A.P.R.N.  •  acetaminophen (TYLENOL) tablet 650 mg, 650 mg, Oral, Q6HRS PRN, OMAR Palacios.P.R.N., 650 mg at 05/01/18 1715  •  diphenhydrAMINE (BENADRYL) tablet/capsule 25 mg, 25 mg, Oral, HS PRN, Poonam Garg A.P.R.N.  •  ondansetron (ZOFRAN) syringe/vial injection 4 mg, 4 mg, Intravenous, Q4HRS PRN, LYLY PalaciosP.R.N., 4 mg at 04/30/18 1947  •  diphenhydrAMINE (BENADRYL) injection 25 mg, 25 mg, Intravenous, Q6HRS PRN, LYLY PalaciosP.R.N.  •  furosemide (LASIX) injection 40 mg, 40 mg, Intravenous, BID DIURETIC, OMAR Palacios.P.R.N., 40 mg at 05/02/18 0527  •  MD ALERT... warfarin (COUMADIN) per pharmacy protocol, , Other, pharmacy to dose, OMAR Palacios.P.R.N.  •  warfarin (COUMADIN) tablet 6 mg, 6 mg, Oral, COUMADIN-DAILY, LYLY PalaciosP.R.N., 6 mg at 05/01/18 1715  •   potassium chloride SA (Kdur) tablet 20 mEq, 20 mEq, Oral, DAILY, LYLY PalaciosP.RJAKE, 20 mEq at 05/02/18 0550  •  polyethylene glycol/lytes (MIRALAX) PACKET 1 Packet, 1 Packet, Oral, DAILY, Napoleon Pepe M.D., 1 Packet at 05/02/18 0546    CARDIAC STUDIES/PROCEDURES:    ANKLE BRACHIAL INDEX (05/01/18)  No evidence of arterial insufficiency.      CARDIAC CATHETERIZATION CONCLUSIONS (03/20/18)  1.  Multivessel coronary artery disease.  2.  Successful percutaneous transluminal coronary angioplasty/stent placement   of the proximal left anterior descending artery with 2.75x8 mm Synergy   drug-eluting stent.  3.  Successful percutaneous transluminal coronary angioplasty/stent placement   of the mid left anterior descending artery with 2.5x16 mm Synergy drug-eluting stent.  4.  Successful percutaneous transluminal coronary angioplasty/stent placement   of the proximal diagonal branch with 2.5x8 mm Synergy drug-eluting stent.  5.  Successful percutaneous transluminal coronary angioplasty/stent placement   of the ostial first obtuse marginal branch with 2.5x12 mm Synergy drug-eluting stent.     CARDIAC CATHETERIZATION CONCLUSIONS (03/13/18)  1.  Severe aortic stenosis with peak gradient of 100 mmHg, mean gradient of 85   mmHg, calculated JULES of 0.38 cm2.  2.  Severe 3-vessel coronary artery disease with high-grade proximal and mid   LAD stenosis, high-grade ostial proximal diagonal branch stenosis, high-grade   ostial proximal obtuse marginal branch stenosis, and chronic total occlusion   of the mid right coronary artery with distal vessel filling via left to right   collaterals.  3.  Normal left ventricular systolic function with ejection fraction of 60%.  4.  Elevated left ventricular end-diastolic pressure.  5.  Elevated right heart pressure with PA systolic pressure of 50 mmHg.  6.  Mildly dilated ascending aorta with heavily calcified aortic valve.  7.  Heavily calcified descending abdominal aorta with  luminal irregularities   of 30-40%, normal size bilateral iliofemoral system with tortuosity.     CAROTID ULTRASOUND (12/13/17)  Carotid ultrasound showing mild plaques.     CTA OF CHEST AND ABDOMEN (04/19/18)  1.  Severely calcified tricuspid aortic valve annulus measures 460 sq mm  2.  Coronary artery ostia are over 1 cm above the annulus  3.  Iliofemoral runoff calibers are at least 5.8 mm short axis left, 6.1 mm right  4.  Severe coronary artery disease    ECHOCARDIOGRAM CONCLUSIONS (05/01/18)  Normal left ventricular systolic function.  Left ventricular ejection fraction is visually estimated to be 55%.  Grade I diastolic dysfunction.  Moderate concentric left ventricular hypertrophy.  The right ventricle was normal in size and function.  Known TAVR aortic valve that is functioning normally with normal transvalvular gradients.   Vmax is   1.9 m/s. Transvalvular gradients are - Peak: 15 mmHg,  Mean: 8 mmHg.   No paravalvular leak is noted.  Moderate mitral annular calcification.  Normal inferior vena cava size and inspiratory collapse.  Right ventricular systolic pressure is estimated to be 37 mmHg.  Normal pericardium without effusion.    ECHOCARDIOGRAM CONCLUSIONS by Dr. Salcedo (04/30/18)  1)  Severe AS  2)  26 mm Robbie 3  EOA:  2.9 cm^2 Mean Gradient:  9 mmHG  3)  No PVL     ECHOCARDIOGRAM CONCLUSIONS [facility] (11/28/17)  Echocardiogram showing low normal left ventricular systolic function, ejection fraction of 50%, severe aortic stenosis with peak velocity of 4.7 m/s, peak gradient of 91 mmHg, mean gradient of 57 mmHg and calculated JULES of 0.6-0.7 cm2, trace aortic regurgitation, mild mitral regurgitation, mild tricuspid regurgitation and estimated right ventricular systolic pressure of 33 mmHg.     EKG performed on (05/02/18) was reviewed: EKG shows sinus rhythm with left bundle branch block.  EKG performed on (04/30/18) EKG shows sinus rhythm with left bundle branch block.  EKG performed on (04/30/18) EKG  shows sinus rhythm with left bundle branch block.  EKG performed on (03/20/18) EKG shows sinus rhythm.    LOWER EXTREMITY ARTERIAL ULTRASOUND (05/01/18)  No evidence of significant arterial occlusive disease of the right lower extremity.     LOWER EXTREMITY VENOUS ULTRASOUND (05/01/18)  No superficial or deep venous thrombosis.     Medical Decision Making, by Problem:  Active Hospital Problems    Diagnosis   • S/P TAVR (transcatheter aortic valve replacement) [Z95.2]   • Acute on chronic diastolic heart failure due to valvular disease ([facility]) [I50.33, I38]   • CAD (coronary artery disease) [I25.10]   • Stented coronary artery [Z95.5]   • DVT (deep venous thrombosis) ([facility]) [I82.409]   • Chronic anticoagulation [Z79.01]       Plan:    1. Successful transcatheter aortic valve replacement (TAVR) with # 26 [place] Robbie S3 valve, transfemoral approach, under conscious sedation (04/30/18): She is clinically doing well.  2. Acute on chronic valvular diastolic congestive heart failure: The overall volume status has improved.  3. Coronary artery disease with stent placement: She is clinically doing well without recurrence of her angina. We will continue with current medical care.  4. Deep venous thrombosis in 12/10/17 at [facility] on (warfarin): Her right leg has developed swelling. All of her test for the right lower extremity has been unremarkable. We have restarted warfarin. INR does not need to be therapeutic for discharge.  5. Disposition: We will plan for discharge today.    CC Tai Ramesh      Quality-Core Measures

## 2018-05-02 NOTE — PROGRESS NOTES
"Bedside report received from day RN. Assumed pt care. Pt a&ox4. POC discussed. Pt states rt calf pain is now \"more like a tooth ache. Just an ache but it is still there.\" Denies any interventions for the pain. Pt denies any other needs at this time. Bed locked and in lowest position. Call light within reach. Hourly rounding in place.   "

## 2018-05-02 NOTE — PROGRESS NOTES
Received handoff report. Assumed care of pt. Pt sitting up in chair. Patient not in distress, AAOX 4. Initial assessment conducted. Safety precautions in place. Call light within reach. Will continue to conduct hourly rounds. Educated to call for assistance.

## 2018-05-04 ENCOUNTER — ANTICOAGULATION VISIT (OUTPATIENT)
Dept: MEDICAL GROUP | Facility: PHYSICIAN GROUP | Age: 83
End: 2018-05-04
Payer: MEDICARE

## 2018-05-04 VITALS
RESPIRATION RATE: 20 BRPM | WEIGHT: 167 LBS | SYSTOLIC BLOOD PRESSURE: 147 MMHG | DIASTOLIC BLOOD PRESSURE: 65 MMHG | BODY MASS INDEX: 27.79 KG/M2 | HEART RATE: 83 BPM

## 2018-05-04 DIAGNOSIS — Z79.01 CHRONIC ANTICOAGULATION: Primary | ICD-10-CM

## 2018-05-04 LAB — INR PPP: 1.4 (ref 2–3.5)

## 2018-05-04 PROCEDURE — 99211 OFF/OP EST MAY X REQ PHY/QHP: CPT | Performed by: NURSE PRACTITIONER

## 2018-05-04 PROCEDURE — 85610 PROTHROMBIN TIME: CPT | Performed by: NURSE PRACTITIONER

## 2018-05-04 NOTE — PROGRESS NOTES
Anticoagulation Summary  As of 5/4/2018    INR goal:   2.0-3.0   TTR:   16.3 % (1.6 wk)   Today's INR:   1.4!   Warfarin maintenance plan:   6 mg (2 mg x 3) every day   Weekly warfarin total:   42 mg   Plan last modified:   John Green, PharmD (4/25/2018)   Next INR check:   5/7/2018   Target end date:   Indefinite    Indications    DVT (deep venous thrombosis) (HCC) [I82.409]             Anticoagulation Episode Summary     INR check location:       Preferred lab:       Send INR reminders to:       Comments:         Anticoagulation Care Providers     Provider Role Specialty Phone number    Napoleon Pepe M.D. Referring Cardiology [phone]    [facility] Responsible  [phone]        Anticoagulation Patient Findings      HPI:  Verónica Krishnan seen in clinic today, on anticoagulation therapy with warfarin for DVT and recent TAVR.  Reason for today's visit (per our collaborative practice policy) is because their last INR was 1.5 on 4/27. Intervention at the last visit: pt had to start holding warfarin for TAVR procedure.   Changes to current medical/health status since last appt: Recent TAVR procedure.  Pt is not bridging.   Denies signs/symptoms of bleeding and/or thrombosis since the last appt.    No interval changes to diet or any interval changes to medications since last appt other than ASA has stopped.   nocomplications or cost restrictions with current therapy.   BP recorded in vitals.  Confirmed dosing regimen.     A/P   INR  SUB-therapeutic.   Possible reason(s) INR is not in range today: Recently held doses.  Bolus today then Pt is to continue with current warfarin dosing regimen.     Follow up appointment in 3 days to reduce risk of adverse events related to this high risk medication, Warfarin.    Purpose of next visit:  They are at a increased risk of clots because INR is below goal.    Other info:  Pt educated to contact our clinic with any changes in medications or s/s  of bleeding or thrombosis  CHEST guidelines recommend frequent INR monitoring at regular intervals (a few days up to a max of 12 weeks) to ensure they are on the proper dose of warfarin and not having any complications from therapy. INRs can dramatically change over a short time period due to diet, medications, and medical conditions.     Noman Shukla, PharmD

## 2018-05-07 ENCOUNTER — OFFICE VISIT (OUTPATIENT)
Dept: CARDIOLOGY | Facility: MEDICAL CENTER | Age: 83
End: 2018-05-07
Payer: COMMERCIAL

## 2018-05-07 ENCOUNTER — ANTICOAGULATION VISIT (OUTPATIENT)
Dept: VASCULAR LAB | Facility: MEDICAL CENTER | Age: 83
End: 2018-05-07
Attending: INTERNAL MEDICINE
Payer: COMMERCIAL

## 2018-05-07 VITALS
HEIGHT: 66 IN | DIASTOLIC BLOOD PRESSURE: 86 MMHG | HEART RATE: 66 BPM | WEIGHT: 158 LBS | BODY MASS INDEX: 25.39 KG/M2 | SYSTOLIC BLOOD PRESSURE: 154 MMHG | OXYGEN SATURATION: 94 %

## 2018-05-07 DIAGNOSIS — I50.33 ACUTE ON CHRONIC DIASTOLIC HEART FAILURE DUE TO VALVULAR DISEASE (HCC): ICD-10-CM

## 2018-05-07 DIAGNOSIS — Z95.2 S/P TAVR (TRANSCATHETER AORTIC VALVE REPLACEMENT): ICD-10-CM

## 2018-05-07 DIAGNOSIS — I82.591 CHRONIC DEEP VEIN THROMBOSIS (DVT) OF OTHER VEIN OF RIGHT LOWER EXTREMITY (HCC): ICD-10-CM

## 2018-05-07 DIAGNOSIS — I10 ESSENTIAL HYPERTENSION: ICD-10-CM

## 2018-05-07 DIAGNOSIS — I38 ACUTE ON CHRONIC DIASTOLIC HEART FAILURE DUE TO VALVULAR DISEASE (HCC): ICD-10-CM

## 2018-05-07 DIAGNOSIS — E78.00 HYPERCHOLESTEREMIA: ICD-10-CM

## 2018-05-07 DIAGNOSIS — Z95.5 STENTED CORONARY ARTERY: ICD-10-CM

## 2018-05-07 DIAGNOSIS — I25.10 CORONARY ARTERY DISEASE INVOLVING NATIVE HEART WITHOUT ANGINA PECTORIS, UNSPECIFIED VESSEL OR LESION TYPE: ICD-10-CM

## 2018-05-07 DIAGNOSIS — Z79.01 CHRONIC ANTICOAGULATION: ICD-10-CM

## 2018-05-07 LAB
INR BLD: 2 (ref 0.9–1.2)
INR PPP: 2 (ref 2–3.5)

## 2018-05-07 PROCEDURE — 99214 OFFICE O/P EST MOD 30 MIN: CPT | Performed by: NURSE PRACTITIONER

## 2018-05-07 PROCEDURE — 85610 PROTHROMBIN TIME: CPT

## 2018-05-07 PROCEDURE — 99211 OFF/OP EST MAY X REQ PHY/QHP: CPT | Performed by: NURSE PRACTITIONER

## 2018-05-07 RX ORDER — LOSARTAN POTASSIUM 25 MG/1
25 TABLET ORAL DAILY
Qty: 30 TAB | Refills: 11 | Status: SHIPPED | OUTPATIENT
Start: 2018-05-07

## 2018-05-07 NOTE — LETTER
[facility]   [address], [address]  [place] [address]  Phone: [phone]  Fax: [phone]              Verónica Krishnan  1935    Encounter Date: 5/7/2018    GIOVANNI Palacios          PROGRESS NOTE:  Chief Complaint   Patient presents with   • Aortic Stenosis     S/p TAVR       Subjective:   Verónica Krishnan is a 82 y.o. female who presents today for hospital follow up post TAVR.    She is a patient of Dr. Pepe in our office, [facility]    Hx of breast CA, HLD, HTN, prior one remote episode of DVT, and now S/P TAVR.    She is feeling great. Her shortness of breath is improving daily. She does continue to have left lower extremity pain behind her calf. It is achy but is improving. We imaged her leg in the hospital with normal duplexes and no sign of DVT or arterial complication.    She has had no episodes of chest pain, palpitations, dizziness/lightheadedness, shortness of breath, orthopnea, or peripheral edema.    Past Medical History:   Diagnosis Date   • Aortic stenosis    • Cancer (HCC) 2015    left breast   • Dental disorder     upper and lower plate   • Heart burn    • Hyperlipidemia    • Hypertension    • Hypoglycemia 2015    patient reports   • Jaundice 1935   • Pneumonia    • Unspecified cataract     bilateral IOL   • Unspecified urinary incontinence    • Urinary bladder disorder      Past Surgical History:   Procedure Laterality Date   • TRANSCATHETER AORTIC VALVE REPLACEMENT  4/30/2018    Procedure: TRANSCATHETER AORTIC VALVE REPLACEMENT;  Surgeon: Napoleon Pepe M.D.;  Location: SURGERY [facility];  Service: Cardiac   • SENG  4/30/2018    Procedure: TTE;  Surgeon: Napoleon Pepe M.D.;  Location: SURGERY [facility];  Service: Cardiac   • MASTECTOMY Left 6/17/2015    Procedure: MASTECTOMY PARTIAL;  Surgeon: Kathy Holley M.D.;  Location: SURGERY SAME DAY [facility];  Service:     • NODE BIOPSY SENTINEL Left 6/17/2015    Procedure: NODE BIOPSY SENTINEL W/LYMPH NODE MAPPING;  Surgeon: Kathy Holley M.D.;  Location: SURGERY SAME DAY [facility];  Service:    • AORTIC VALVE REPLACEMENT      TAVR   • CARDIAC CATH       Family History   Problem Relation Age of Onset   • Heart Attack Mother      Social History     Social History   • Marital status:      Spouse name: N/A   • Number of children: N/A   • Years of education: N/A     Occupational History   • Not on file.     Social History Main Topics   • Smoking status: Former Smoker     Years: 15.00     Types: Cigarettes     Quit date: 1/1/1970   • Smokeless tobacco: Never Used   • Alcohol use Yes      Comment: occ.    • Drug use: No   • Sexual activity: Not Currently     Other Topics Concern   • Not on file     Social History Narrative   • No narrative on file     Allergies   Allergen Reactions   • Aluminum      Sore throat, nausea, dizzy   • Amlodipine      Not working   • Iron Vomiting, Nausea and Swelling   • Lisinopril Swelling   • Other Food Hives     Oranges     Outpatient Encounter Prescriptions as of 5/7/2018   Medication Sig Dispense Refill   • losartan (COZAAR) 25 MG Tab Take 1 Tab by mouth every day. 30 Tab 11   • furosemide (LASIX) 40 MG Tab Take 1 Tab by mouth 2 Times a Day. 60 Tab 11   • warfarin (COUMADIN) 6 MG Tab Take 1 Tab by mouth COUMADIN-DAILY. 30 Tab 0   • pantoprazole (PROTONIX) 40 MG Tablet Delayed Response Take 1 Tab by mouth every day. 30 Tab 0   • Cyanocobalamin 5000 MCG TABLET DISPERSIBLE Take 1 Tab by mouth every day.     • clopidogrel (PLAVIX) 75 MG Tab Take 1 Tab by mouth every day. 30 Tab 11   • alendronate (FOSAMAX) 35 MG tablet Take 35 mg by mouth every 7 days.     • atorvastatin (LIPITOR) 40 MG Tab Take 40 mg by mouth every evening.     • levothyroxine (SYNTHROID) 112 MCG Tab Take 112 mcg by mouth Every morning on an empty stomach.     • metoprolol (LOPRESSOR) 50 MG Tab Take 50 mg by mouth 2 times  "a day.     • potassium chloride (KLOR-CON) 20 MEQ Pack Take 20 mEq by mouth every day.     • ranitidine (ZANTAC) 150 MG capsule Take 150 mg by mouth 1 time daily as needed.     • Ascorbic Acid (VITAMIN C) 500 MG Cap Take 1 Cap by mouth every day.     • LUTEIN PO Take 40 mg by mouth every day.     • MAGNESIUM CITRATE PO Take 1 Tab by mouth every day.     • Zinc 25 MG Tab Take 1 Tab by mouth every day.     • oxybutynin (DITROPAN) 5 MG Tab TAKE 1 TABLET TWICE A  Tab 0   • anastrozole (ARIMIDEX) 1 MG Tab Take 1 mg by mouth every day.     • diphenhydrAMINE (BENADRYL) 25 MG TABS Take 25 mg by mouth 1 time daily as needed for Itching.     • Probiotic Product (PROBIOTIC ACIDOPHILUS BEADS PO) Take 1 Tab by mouth every day.     • Calcium Carbonate 600 MG TABS Take 1 Tab by mouth every day.     • Cholecalciferol (VITAMIN D) 400 UNIT TABS Take 400 Units by mouth 2 Times a Day.       No facility-administered encounter medications on file as of 5/7/2018.      Review of Systems   Constitutional: Negative for fever and malaise/fatigue.   Respiratory: Negative for cough and shortness of breath.    Cardiovascular: Negative for chest pain, palpitations, orthopnea, claudication, leg swelling and PND.   Gastrointestinal: Negative for abdominal pain.   Musculoskeletal: Positive for myalgias.        L leg pain behind knee-no swelling, improvement in pain but pain remains since POD 1   Neurological: Negative for dizziness.   All other systems reviewed and are negative.       Objective:   /86   Pulse 66   Ht 1.676 m (5' 6\")   Wt 71.7 kg (158 lb)   SpO2 94%   BMI 25.50 kg/m²      Physical Exam   Constitutional: She is oriented to person, place, and time. She appears well-developed and well-nourished.   HENT:   Head: Normocephalic and atraumatic.   Eyes: EOM are normal.   Neck: No JVD present.   Cardiovascular: Normal rate, regular rhythm, normal heart sounds and intact distal pulses.    Pulmonary/Chest: Breath sounds " normal.   Musculoskeletal: Normal range of motion. She exhibits no edema.   Neurological: She is alert and oriented to person, place, and time.   Skin: Skin is warm and dry.   Nursing note and vitals reviewed.      Assessment:     1. Acute on chronic diastolic heart failure due to valvular disease (HCC)     2. S/P TAVR (transcatheter aortic valve replacement)     3. Coronary artery disease involving native heart without angina pectoris, unspecified vessel or lesion type     4. Essential hypertension     5. Hypercholesteremia     6. Stented coronary artery     7. Chronic anticoagulation     8. Chronic deep vein thrombosis (DVT) of other vein of right lower extremity (HCC)       Medical Decision Making:  Today's Assessment / Status / Plan:     1. S/P TAVR  -doing well, groins CDI  -LLE pain improving, follow with PCP if worsens  -cont plavix (1 year for coronary stent), re-start ASA 81 mg lifelong once off coumadin  -echo 1 month with follow up with JI  -SBE card understands and discussed    2. Acute on chronic diastolic heart failure  -improving, continue diuresis  -consider dc of lasix at next apt  -cont bb, start ARB (allergy to ACE), cont lasix/K    3. HTN  -start losartan 25 mg QD, increase for SBP >130  -cont metoprolol and lasix  -she will call if SBP remains elevated    4. HLD  -statin  -LDL goal <70 with CAD  -lipid and CMP yearly with primary cardiologist    5. DVT  -one remote occurrence  -consider dc of coumadin with primary cardiologist  -[facility]    6.CAD with prior stent placements  -no angina or PUGH  -cont plavix, stopped ASA with triple therapy, re-start when off coumadin  -cont statin    FU in clinic in 1 months with JI with echo    Patient verbalizes understanding and agrees with the plan of care.     Collaborating MD: Afshin CAZARES        No Recipients

## 2018-05-08 ASSESSMENT — ENCOUNTER SYMPTOMS
COUGH: 0
SHORTNESS OF BREATH: 0
PALPITATIONS: 0
DIZZINESS: 0
ABDOMINAL PAIN: 0
PND: 0
CLAUDICATION: 0
ORTHOPNEA: 0
MYALGIAS: 1
FEVER: 0

## 2018-05-08 NOTE — PROGRESS NOTES
Anticoagulation Summary  As of 5/7/2018    INR goal:   2.0-3.0   TTR:   13.5 % (2 wk)   Today's INR:   2.0   Warfarin maintenance plan:   6 mg (2 mg x 3) every day   Weekly warfarin total:   42 mg   Plan last modified:   John Green, PharmD (4/25/2018)   Next INR check:   5/11/2018   Target end date:   Indefinite    Indications    DVT (deep venous thrombosis) (HCC) [I82.409]             Anticoagulation Episode Summary     INR check location:       Preferred lab:       Send INR reminders to:       Comments:         Anticoagulation Care Providers     Provider Role Specialty Phone number    Napoleon Pepe M.D. Referring Cardiology [phone]    [facility] Responsible  [phone]        Anticoagulation Patient Findings      HPI:  Verónica Krishnan seen in clinic today for follow up on anticoagulation therapy in the presence of DVT Denies any changes to current medical/health status since last appointment. Denies any medication or diet changes. No current symptoms of bleeding or thrombosis reported.    A/P:   INR therapeutic. Resume 6 mg daily. Will check on length of therapy as this is her 1st VTE. BP recorded in vitals.    Follow up appointment on Friday.    Next Appointment: Friday , May 11, 2018 at 4:30 pm in [place].     Melvi ESPOSITO

## 2018-05-08 NOTE — PROGRESS NOTES
Chief Complaint   Patient presents with   • Aortic Stenosis     S/p TAVR       Subjective:   Verónica Krishnan is a 82 y.o. female who presents today for hospital follow up post TAVR.    She is a patient of Dr. Pepe in our office, [facility].    Hx of breast CA, HLD, HTN, prior one remote episode of DVT, and now S/P TAVR.    She is feeling great. Her shortness of breath is improving daily. She does continue to have left lower extremity pain behind her calf. It is achy but is improving. We imaged her leg in the hospital with normal duplexes and no sign of DVT or arterial complication.    She has had no episodes of chest pain, palpitations, dizziness/lightheadedness, shortness of breath, orthopnea, or peripheral edema.    Past Medical History:   Diagnosis Date   • Aortic stenosis    • Cancer (HCC) 2015    left breast   • Dental disorder     upper and lower plate   • Heart burn    • Hyperlipidemia    • Hypertension    • Hypoglycemia 2015    patient reports   • Jaundice 1937   • Pneumonia    • Unspecified cataract     bilateral IOL   • Unspecified urinary incontinence    • Urinary bladder disorder      Past Surgical History:   Procedure Laterality Date   • TRANSCATHETER AORTIC VALVE REPLACEMENT  4/30/2018    Procedure: TRANSCATHETER AORTIC VALVE REPLACEMENT;  Surgeon: Napoleon Pepe M.D.;  Location: SURGERY [facility];  Service: Cardiac   • SENG  4/30/2018    Procedure: TTE;  Surgeon: Napoleon Pepe M.D.;  Location: SURGERY [facility];  Service: Cardiac   • MASTECTOMY Left 6/17/2015    Procedure: MASTECTOMY PARTIAL;  Surgeon: Kathy Holley M.D.;  Location: SURGERY SAME DAY [facility];  Service:    • NODE BIOPSY SENTINEL Left 6/17/2015    Procedure: NODE BIOPSY SENTINEL W/LYMPH NODE MAPPING;  Surgeon: Kathy Holley M.D.;  Location: SURGERY SAME DAY [facility];  Service:    • AORTIC VALVE REPLACEMENT      TAVR   • CARDIAC CATH       Family History   Problem  Relation Age of Onset   • Heart Attack Mother      Social History     Social History   • Marital status:      Spouse name: N/A   • Number of children: N/A   • Years of education: N/A     Occupational History   • Not on file.     Social History Main Topics   • Smoking status: Former Smoker     Years: 15.00     Types: Cigarettes     Quit date: 1/1/1970   • Smokeless tobacco: Never Used   • Alcohol use Yes      Comment: occ.    • Drug use: No   • Sexual activity: Not Currently     Other Topics Concern   • Not on file     Social History Narrative   • No narrative on file     Allergies   Allergen Reactions   • Aluminum      Sore throat, nausea, dizzy   • Amlodipine      Not working   • Iron Vomiting, Nausea and Swelling   • Lisinopril Swelling   • Other Food Hives     Oranges     Outpatient Encounter Prescriptions as of 5/7/2018   Medication Sig Dispense Refill   • losartan (COZAAR) 25 MG Tab Take 1 Tab by mouth every day. 30 Tab 11   • furosemide (LASIX) 40 MG Tab Take 1 Tab by mouth 2 Times a Day. 60 Tab 11   • warfarin (COUMADIN) 6 MG Tab Take 1 Tab by mouth COUMADIN-DAILY. 30 Tab 0   • pantoprazole (PROTONIX) 40 MG Tablet Delayed Response Take 1 Tab by mouth every day. 30 Tab 0   • Cyanocobalamin 5000 MCG TABLET DISPERSIBLE Take 1 Tab by mouth every day.     • clopidogrel (PLAVIX) 75 MG Tab Take 1 Tab by mouth every day. 30 Tab 11   • alendronate (FOSAMAX) 35 MG tablet Take 35 mg by mouth every 7 days.     • atorvastatin (LIPITOR) 40 MG Tab Take 40 mg by mouth every evening.     • levothyroxine (SYNTHROID) 112 MCG Tab Take 112 mcg by mouth Every morning on an empty stomach.     • metoprolol (LOPRESSOR) 50 MG Tab Take 50 mg by mouth 2 times a day.     • potassium chloride (KLOR-CON) 20 MEQ Pack Take 20 mEq by mouth every day.     • ranitidine (ZANTAC) 150 MG capsule Take 150 mg by mouth 1 time daily as needed.     • Ascorbic Acid (VITAMIN C) 500 MG Cap Take 1 Cap by mouth every day.     • LUTEIN PO Take 40 mg  "by mouth every day.     • MAGNESIUM CITRATE PO Take 1 Tab by mouth every day.     • Zinc 25 MG Tab Take 1 Tab by mouth every day.     • oxybutynin (DITROPAN) 5 MG Tab TAKE 1 TABLET TWICE A  Tab 0   • anastrozole (ARIMIDEX) 1 MG Tab Take 1 mg by mouth every day.     • diphenhydrAMINE (BENADRYL) 25 MG TABS Take 25 mg by mouth 1 time daily as needed for Itching.     • Probiotic Product (PROBIOTIC ACIDOPHILUS BEADS PO) Take 1 Tab by mouth every day.     • Calcium Carbonate 600 MG TABS Take 1 Tab by mouth every day.     • Cholecalciferol (VITAMIN D) 400 UNIT TABS Take 400 Units by mouth 2 Times a Day.       No facility-administered encounter medications on file as of 5/7/2018.      Review of Systems   Constitutional: Negative for fever and malaise/fatigue.   Respiratory: Negative for cough and shortness of breath.    Cardiovascular: Negative for chest pain, palpitations, orthopnea, claudication, leg swelling and PND.   Gastrointestinal: Negative for abdominal pain.   Musculoskeletal: Positive for myalgias.        L leg pain behind knee-no swelling, improvement in pain but pain remains since POD 1   Neurological: Negative for dizziness.   All other systems reviewed and are negative.       Objective:   /86   Pulse 66   Ht 1.676 m (5' 6\")   Wt 71.7 kg (158 lb)   SpO2 94%   BMI 25.50 kg/m²     Physical Exam   Constitutional: She is oriented to person, place, and time. She appears well-developed and well-nourished.   HENT:   Head: Normocephalic and atraumatic.   Eyes: EOM are normal.   Neck: No JVD present.   Cardiovascular: Normal rate, regular rhythm, normal heart sounds and intact distal pulses.    Pulmonary/Chest: Breath sounds normal.   Musculoskeletal: Normal range of motion. She exhibits no edema.   Neurological: She is alert and oriented to person, place, and time.   Skin: Skin is warm and dry.   Nursing note and vitals reviewed.      Assessment:     1. Acute on chronic diastolic heart failure due to " valvular disease (HCC)     2. S/P TAVR (transcatheter aortic valve replacement)     3. Coronary artery disease involving native heart without angina pectoris, unspecified vessel or lesion type     4. Essential hypertension     5. Hypercholesteremia     6. Stented coronary artery     7. Chronic anticoagulation     8. Chronic deep vein thrombosis (DVT) of other vein of right lower extremity (HCC)       Medical Decision Making:  Today's Assessment / Status / Plan:     1. S/P TAVR  -doing well, groins CDI  -LLE pain improving, follow with PCP if worsens  -cont plavix (1 year for coronary stent), re-start ASA 81 mg lifelong once off coumadin  -echo 1 month with follow up with JI  -S[facility] card understands and discussed    2. Acute on chronic diastolic heart failure  -improving, continue diuresis  -consider dc of lasix at next apt  -cont bb, start ARB (allergy to ACE), cont lasix/K    3. HTN  -start losartan 25 mg QD, increase for SBP >130  -cont metoprolol and lasix  -she will call if SBP remains elevated    4. HLD  -statin  -LDL goal <70 with CAD  -lipid and CMP yearly with primary cardiologist    5. DVT  -one remote occurrence  -consider dc of coumadin with primary cardiologist  -[facility]    6.CAD with prior stent placements  -no angina or PUGH  -cont plavix, stopped ASA with triple therapy, re-start when off coumadin  -cont statin    FU in clinic in 1 months with JI with echo    Patient verbalizes understanding and agrees with the plan of care.     Collaborating MD: Afshin CAZARES

## 2018-05-11 ENCOUNTER — ANTICOAGULATION VISIT (OUTPATIENT)
Dept: MEDICAL GROUP | Facility: PHYSICIAN GROUP | Age: 83
End: 2018-05-11
Payer: MEDICARE

## 2018-05-11 DIAGNOSIS — Z79.01 CHRONIC ANTICOAGULATION: Primary | ICD-10-CM

## 2018-05-11 LAB — INR PPP: 2.2 (ref 2–3.5)

## 2018-05-11 PROCEDURE — 85610 PROTHROMBIN TIME: CPT | Performed by: FAMILY MEDICINE

## 2018-05-11 PROCEDURE — 99999 PR NO CHARGE: CPT | Performed by: FAMILY MEDICINE

## 2018-05-11 NOTE — PROGRESS NOTES
Anticoagulation Summary  As of 5/11/2018    INR goal:   2.0-3.0   TTR:   --   Today's INR:   2.2   Warfarin maintenance plan:   6 mg (2 mg x 3) every day   Weekly warfarin total:   42 mg   Plan last modified:   John Green, PharmD (4/25/2018)   Next INR check:   5/18/2018   Target end date:   Indefinite    Indications    DVT (deep venous thrombosis) (HCC) [I82.409]             Anticoagulation Episode Summary     INR check location:       Preferred lab:       Send INR reminders to:       Comments:         Anticoagulation Care Providers     Provider Role Specialty Phone number    Napoleon Pepe M.D. Referring Cardiology [phone]    [facility] Responsible  [phone]        Anticoagulation Patient Findings      HPI:  Verónica Krishnan seen in clinic today, on anticoagulation therapy with warfarin for Hx DVT. DVT was discovered subsequently on a scan. Pt denies any predisposing event or issues, and was never symptomatic.   Reason for today's visit (per our collaborative practice policy) is because their last INR was 2.0 on 5/7/18. Intervention at the last visit: continued with 6mg daily  Changes to current medical/health status since last appt: denies  No - signs/symptoms of bleeding and/or thrombosis since the last appt.    No - interval changes to diet or any interval changes to medications since last appt.   No - complications or cost restrictions with current therapy.   BP declined    A/P   INR  is-therapeutic.   Possible reason(s) INR is not in range today: NA  Will have pt continue with her same warfarin dosing.     Follow up appointment in 1 weeks to reduce risk of adverse events related to this high risk medication, Warfarin.    Other info:  Pt educated to contact our clinic with any changes in medications or s/s of bleeding or thrombosis  CHEST guidelines recommend frequent INR monitoring at regular intervals (a few days up to a max of 12 weeks) to ensure they are on the  proper dose of warfarin and not having any complications from therapy. INRs can dramatically change over a short time period due to diet, medications, and medical conditions.     Shalonda Turpin, PharmD

## 2018-05-11 NOTE — Clinical Note
Per pt, the DVT was discovered in her right lower leg when [facility] was [facility]. Denies any surgeries or incidents with that leg. No long car rides, or immobility. Pt has never had s/s DVT, other than swelling which was attributed to CHF.

## 2018-05-17 ENCOUNTER — ANTICOAGULATION MONITORING (OUTPATIENT)
Dept: VASCULAR LAB | Facility: MEDICAL CENTER | Age: 83
End: 2018-05-17

## 2018-05-17 DIAGNOSIS — I82.4Y9 DEEP VEIN THROMBOSIS (DVT) OF PROXIMAL LOWER EXTREMITY, UNSPECIFIED CHRONICITY, UNSPECIFIED LATERALITY (HCC): ICD-10-CM

## 2018-05-18 ENCOUNTER — ANTICOAGULATION VISIT (OUTPATIENT)
Dept: MEDICAL GROUP | Facility: PHYSICIAN GROUP | Age: 83
End: 2018-05-18
Payer: MEDICARE

## 2018-05-18 VITALS
DIASTOLIC BLOOD PRESSURE: 70 MMHG | SYSTOLIC BLOOD PRESSURE: 120 MMHG | HEART RATE: 70 BPM | BODY MASS INDEX: 25.5 KG/M2 | WEIGHT: 158 LBS

## 2018-05-18 DIAGNOSIS — I82.4Y9 DEEP VEIN THROMBOSIS (DVT) OF PROXIMAL LOWER EXTREMITY, UNSPECIFIED CHRONICITY, UNSPECIFIED LATERALITY (HCC): ICD-10-CM

## 2018-05-18 LAB
INR PPP: 4.2 (ref 2–3.5)
INR PPP: 4.2 (ref 2–3.5)

## 2018-05-18 PROCEDURE — 85610 PROTHROMBIN TIME: CPT | Performed by: NURSE PRACTITIONER

## 2018-05-18 PROCEDURE — 99211 OFF/OP EST MAY X REQ PHY/QHP: CPT | Performed by: NURSE PRACTITIONER

## 2018-05-18 NOTE — PROGRESS NOTES
OP Anticoagulation Service Note    Date: 5/18/2018  Vitals:    05/18/18 1039   BP: 120/70   Pulse: 70   Weight: 71.7 kg (158 lb)       Anticoagulation Summary  As of 5/18/2018    INR goal:   2.0-3.0   TTR:   35.8 % (3.6 wk)   Today's INR:   4.2!   Warfarin maintenance plan:   4 mg (2 mg x 2) on Sat; 6 mg (2 mg x 3) all other days   Weekly warfarin total:   40 mg   Plan last modified:   Matthew Mariee, PharmD (5/18/2018)   Next INR check:   6/1/2018   Target end date:   Indefinite    Indications    DVT (deep venous thrombosis) (HCC) [I82.409]             Anticoagulation Episode Summary     INR check location:       Preferred lab:       Send INR reminders to:       Comments:         Anticoagulation Care Providers     Provider Role Specialty Phone number    Napoleon Pepe M.D. Referring Cardiology [phone]    [facility] Anticoagulation Services Responsible  [phone]        Anticoagulation Patient Findings      HPI:   Verónica Krishnan seen in clinic today, they are here today for a INR check on anticoagulation therapy with warfarin because they have a PMH of DVTs    The reason for today's visit is to prevent morbidity and mortality from a blood clot  and to reduce the risk of bleeding while on a anticoagulant.     Reason for today's visit (per our collaborative practice policy) is because their last INR was 2.2  on  5/11.   Intervention at the last visit:  none    Additional education provided today regarding reducing bleed risk and dietary constraints:  About diet    Any upcoming procedures:   none    Confirmed warfarin dosing regimen  Interval Changes with foods rich in vitamin K: No  Interval Changes in ETOH:   No  Interval Changes in smoking status:  No  Interval Changes in medication:  No   Cost restriction:  No    S/S of bleeding or bruising:  No  Signs/symptoms  thrombosis since the last appt:  No  Bleed risk is:  moderate,     3 vitals included with today's appt :No  (BP, HR, weight, ht, RR)      Assessment:   INR  supra-therapeutic.     Possible reason(s) for INR today:   might be diet     Intervention required today to prevent:    They are at a increased risk of bleeding because INR above goal.    They have a TTR of 35.8  which is not at target (TTR target/goal is 100%) and requires close follow up to prevent a adverse event (the lower the TTR the higher risk of clots, strokes, or bleeding).       Plan:  Hold today decrease weekly warfarin dose as noted      Follow up:  Follow up appointment in 2 week(s) per pt       Other info:  Pt educated to contact our clinic with any changes in medications or s/s of bleeding or thrombosis    CHEST guidelines recommend frequent INR monitoring at regular intervals (a few days up to a max of 12 weeks) to ensure they are on the proper dose of warfarin and not having any complications from therapy.  INRs can dramatically change over a short time period due to diet, medications, and medical conditions.

## 2018-05-21 ENCOUNTER — ANTICOAGULATION MONITORING (OUTPATIENT)
Dept: VASCULAR LAB | Facility: MEDICAL CENTER | Age: 83
End: 2018-05-21

## 2018-05-21 DIAGNOSIS — I82.4Y9 DEEP VEIN THROMBOSIS (DVT) OF PROXIMAL LOWER EXTREMITY, UNSPECIFIED CHRONICITY, UNSPECIFIED LATERALITY (HCC): ICD-10-CM

## 2018-06-07 ENCOUNTER — TELEPHONE (OUTPATIENT)
Dept: CARDIOLOGY | Facility: MEDICAL CENTER | Age: 83
End: 2018-06-07

## 2018-06-15 ENCOUNTER — TELEPHONE (OUTPATIENT)
Dept: VASCULAR LAB | Facility: MEDICAL CENTER | Age: 83
End: 2018-06-15

## 2018-06-15 NOTE — TELEPHONE ENCOUNTER
[facility]    Left message for pt to establish care with [facility].    Noman Shukla, PharmD

## 2018-06-28 ENCOUNTER — TELEPHONE (OUTPATIENT)
Dept: VASCULAR LAB | Facility: MEDICAL CENTER | Age: 83
End: 2018-06-28

## 2018-06-28 NOTE — TELEPHONE ENCOUNTER
[facility]     with emergency contact to establish care in [facility].  Unable to leave VM with pt. Sent letter of compliance.    Noman Shukla, PharmD

## 2018-07-19 ENCOUNTER — TELEPHONE (OUTPATIENT)
Dept: CARDIOLOGY | Facility: MEDICAL CENTER | Age: 83
End: 2018-07-19

## 2018-07-19 NOTE — TELEPHONE ENCOUNTER
b/p meds, blood thinner necessity   Received: Today   Message Contents   Linda Paul  Mckenziesergei Beard R.N.   Phone Number: [phone]             SERJ[facility]/SC/doron PADILLA [facility], needing to discuss b/p meds & blood thinners.  Pt recently had hip replacement, has been d/c'd from rehab.   Bianca needs to discuss the necessity of getting pt back on blood thinner, specifically warfarin or eliquis or anything at all.     Bianca can be reached on cell# [phone]      S/w Bianca PADILLA at [facility] and discussed anticoagulation refering to Poonam ESPOSITO's office visit note from 5/07.     Bianca explains that on 6/04 pt fell and required hip and wrist surgery. Pt missed her 1 month follow-up w/ SERJIO 6/07 due to this fall and subsequent surgeries. She was in rehab for several weeks and recently discharged and saw Bianca for follow-up.     Bianca was specifically asking if pt needs to be on coumadin for her new valve. Explained to Bianca that based on SC's note: DVT, consider dc of coumadin with primary cardiologist. continue plavix for 1 year for coronary stent, re-start ASA 81 mg lifelong once off coumadin. Bianca said pt has no recent DVT or signs of that so will discontinue coumadin and place pt on ASA 81mg. Bianca will notify the pt.     Pt is scheduled for follow up with Dr. Pepe 8/09.

## 2018-08-08 ENCOUNTER — TELEPHONE (OUTPATIENT)
Dept: VASCULAR LAB | Facility: MEDICAL CENTER | Age: 83
End: 2018-08-08

## 2018-08-08 NOTE — TELEPHONE ENCOUNTER
[facility]    Pt states she refuses to take her warfarin any more.  She will discuss anticoagulation with cardiology tomorrow on 8/9/18.  Requested pt to contact the clinic after visit with cardiology, however will follow up with this pt again to determine status of anticoagulation.    Noman Shukla, PharmD

## 2018-08-09 ENCOUNTER — APPOINTMENT (OUTPATIENT)
Dept: CARDIOLOGY | Facility: MEDICAL CENTER | Age: 83
End: 2018-08-09
Payer: COMMERCIAL

## 2018-08-09 ENCOUNTER — OFFICE VISIT (OUTPATIENT)
Dept: CARDIOLOGY | Facility: MEDICAL CENTER | Age: 83
End: 2018-08-09
Payer: COMMERCIAL

## 2018-08-09 VITALS
DIASTOLIC BLOOD PRESSURE: 60 MMHG | SYSTOLIC BLOOD PRESSURE: 128 MMHG | WEIGHT: 154 LBS | BODY MASS INDEX: 24.75 KG/M2 | HEIGHT: 66 IN | OXYGEN SATURATION: 97 % | HEART RATE: 64 BPM

## 2018-08-09 DIAGNOSIS — Z95.2 S/P TAVR (TRANSCATHETER AORTIC VALVE REPLACEMENT): ICD-10-CM

## 2018-08-09 DIAGNOSIS — I25.10 CORONARY ARTERY DISEASE INVOLVING NATIVE CORONARY ARTERY OF NATIVE HEART WITHOUT ANGINA PECTORIS: ICD-10-CM

## 2018-08-09 DIAGNOSIS — I50.9 CHF DUE TO VALVULAR DISEASE (HCC): ICD-10-CM

## 2018-08-09 DIAGNOSIS — I38 CHF DUE TO VALVULAR DISEASE (HCC): ICD-10-CM

## 2018-08-09 DIAGNOSIS — Z95.5 STENTED CORONARY ARTERY: ICD-10-CM

## 2018-08-09 PROBLEM — I50.32 CHRONIC DIASTOLIC CONGESTIVE HEART FAILURE (HCC): Status: RESOLVED | Noted: 2018-02-01 | Resolved: 2018-08-09

## 2018-08-09 PROBLEM — I50.33 ACUTE ON CHRONIC DIASTOLIC HEART FAILURE DUE TO VALVULAR DISEASE (HCC): Status: RESOLVED | Noted: 2018-04-30 | Resolved: 2018-08-09

## 2018-08-09 PROCEDURE — 99214 OFFICE O/P EST MOD 30 MIN: CPT | Performed by: INTERNAL MEDICINE

## 2018-08-09 RX ORDER — CLONIDINE HYDROCHLORIDE 0.1 MG/1
TABLET ORAL
COMMUNITY
Start: 2018-07-19

## 2018-08-09 ASSESSMENT — ENCOUNTER SYMPTOMS
PALPITATIONS: 0
DEPRESSION: 0
CARDIOVASCULAR NEGATIVE: 1
PSYCHIATRIC NEGATIVE: 1
RESPIRATORY NEGATIVE: 1
WEAKNESS: 0
COUGH: 0
ABDOMINAL PAIN: 0
NEUROLOGICAL NEGATIVE: 1
MYALGIAS: 0
GASTROINTESTINAL NEGATIVE: 1
CONSTITUTIONAL NEGATIVE: 1
SHORTNESS OF BREATH: 0
BLURRED VISION: 0
FEVER: 0
CLAUDICATION: 0
HEADACHES: 0
BRUISES/BLEEDS EASILY: 0
NAUSEA: 0
DIZZINESS: 0
VOMITING: 0
NERVOUS/ANXIOUS: 0
FOCAL WEAKNESS: 0
WEIGHT LOSS: 0
CHILLS: 0
DOUBLE VISION: 0
MUSCULOSKELETAL NEGATIVE: 1
EYES NEGATIVE: 1

## 2018-08-09 NOTE — PROGRESS NOTES
No chief complaint on file.      Subjective:   Verónica Krishnan is a 83 y.o. female who presents today for hospital follow up.    Since the discharge from [facility] on 05/02/18 status post TAVR, she has been doing well. She admits to mild fatigue. She denies shortness of breath, dyspnea on exertion, chest pain, dizziness or syncope. She did suffer a fall due to tripping over her neighbors cat and suffered left arm fracture which she was in a cast for and left hip fracture requiring surgical repair.    Past Medical History:   Diagnosis Date   • Aortic stenosis    • Cancer (HCC) 2015    left breast   • Dental disorder     upper and lower plate   • Heart burn    • Hyperlipidemia    • Hypertension    • Hypoglycemia 2015    patient reports   • Jaundice 1937   • Pneumonia    • Unspecified cataract     bilateral IOL   • Unspecified urinary incontinence    • Urinary bladder disorder      Past Surgical History:   Procedure Laterality Date   • TRANSCATHETER AORTIC VALVE REPLACEMENT  4/30/2018    Procedure: TRANSCATHETER AORTIC VALVE REPLACEMENT;  Surgeon: Napoleon Pepe M.D.;  Location: SURGERY [facility];  Service: Cardiac   • SENG  4/30/2018    Procedure: TTE;  Surgeon: Napoleon Pepe M.D.;  Location: SURGERY [facility];  Service: Cardiac   • MASTECTOMY Left 6/17/2015    Procedure: MASTECTOMY PARTIAL;  Surgeon: Kathy Holley M.D.;  Location: SURGERY SAME DAY [facility];  Service:    • NODE BIOPSY SENTINEL Left 6/17/2015    Procedure: NODE BIOPSY SENTINEL W/LYMPH NODE MAPPING;  Surgeon: Kathy Holley M.D.;  Location: SURGERY SAME DAY [facility];  Service:    • AORTIC VALVE REPLACEMENT      TAVR   • CARDIAC CATH       Family History   Problem Relation Age of Onset   • Heart Attack Mother      Social History     Social History   • Marital status:      Spouse name: N/A   • Number of children: N/A   • Years of education: N/A     Occupational History   • Not on file.     Social  History Main Topics   • Smoking status: Former Smoker     Years: 15.00     Types: Cigarettes     Quit date: 1/1/1970   • Smokeless tobacco: Never Used   • Alcohol use Yes      Comment: occ.    • Drug use: No   • Sexual activity: Not Currently     Other Topics Concern   • Not on file     Social History Narrative   • No narrative on file     Allergies   Allergen Reactions   • Aluminum      Sore throat, nausea, dizzy   • Amlodipine      Not working   • Iron Vomiting, Nausea and Swelling   • Lisinopril Swelling   • Other Food Hives     Oranges     Medications reviewed.    Outpatient Encounter Prescriptions as of 8/9/2018   Medication Sig Dispense Refill   • cloNIDine (CATAPRES) 0.1 MG Tab      • aspirin EC (ECOTRIN) 81 MG Tablet Delayed Response Take 81 mg by mouth every day.     • Hyaluronic Acid-Vitamin C (HYALURONIC ACID PO) Take  by mouth.     • losartan (COZAAR) 25 MG Tab Take 1 Tab by mouth every day. (Patient taking differently: Take 50 mg by mouth 2 Times a Day.) 30 Tab 11   • furosemide (LASIX) 40 MG Tab Take 1 Tab by mouth 2 Times a Day. 60 Tab 11   • Cyanocobalamin 5000 MCG TABLET DISPERSIBLE Take 1 Tab by mouth every day.     • clopidogrel (PLAVIX) 75 MG Tab Take 1 Tab by mouth every day. 30 Tab 11   • alendronate (FOSAMAX) 35 MG tablet Take 70 mg by mouth every 7 days.     • atorvastatin (LIPITOR) 40 MG Tab Take 40 mg by mouth every evening.     • levothyroxine (SYNTHROID) 112 MCG Tab Take 75 mcg by mouth Every morning on an empty stomach.     • metoprolol (LOPRESSOR) 50 MG Tab Take 50 mg by mouth 2 times a day.     • potassium chloride (KLOR-CON) 20 MEQ Pack Take 20 mEq by mouth every day.     • ranitidine (ZANTAC) 150 MG capsule Take 150 mg by mouth 1 time daily as needed.     • Ascorbic Acid (VITAMIN C) 500 MG Cap Take 1 Cap by mouth every day.     • LUTEIN PO Take 40 mg by mouth every day.     • MAGNESIUM CITRATE PO Take 1 Tab by mouth every day.     • Zinc 25 MG Tab Take 1 Tab by mouth every day.     •  "oxybutynin (DITROPAN) 5 MG Tab TAKE 1 TABLET TWICE A  Tab 0   • anastrozole (ARIMIDEX) 1 MG Tab Take 1 mg by mouth every day.     • diphenhydrAMINE (BENADRYL) 25 MG TABS Take 25 mg by mouth 1 time daily as needed for Itching.     • Probiotic Product (PROBIOTIC ACIDOPHILUS BEADS PO) Take 1 Tab by mouth every day.     • Calcium Carbonate 600 MG TABS Take 1 Tab by mouth every day.     • Cholecalciferol (VITAMIN D) 400 UNIT TABS Take 400 Units by mouth 2 Times a Day.     • pantoprazole (PROTONIX) 40 MG Tablet Delayed Response Take 1 Tab by mouth every day. 30 Tab 0     No facility-administered encounter medications on file as of 8/9/2018.      Review of Systems   Constitutional: Negative.  Negative for chills, fever, malaise/fatigue and weight loss.   HENT: Negative.  Negative for hearing loss.    Eyes: Negative.  Negative for blurred vision and double vision.   Respiratory: Negative.  Negative for cough and shortness of breath.    Cardiovascular: Negative.  Negative for chest pain, palpitations, claudication and leg swelling.   Gastrointestinal: Negative.  Negative for abdominal pain, nausea and vomiting.   Genitourinary: Negative.  Negative for dysuria and urgency.   Musculoskeletal: Negative.  Negative for joint pain and myalgias.   Skin: Negative.  Negative for itching and rash.   Neurological: Negative.  Negative for dizziness, focal weakness, weakness and headaches.   Endo/Heme/Allergies: Negative.  Does not bruise/bleed easily.   Psychiatric/Behavioral: Negative.  Negative for depression. The patient is not nervous/anxious.         Objective:   /60   Pulse 64   Ht 1.676 m (5' 6\")   Wt 69.9 kg (154 lb)   SpO2 97%   BMI 24.86 kg/m²     Physical Exam   Constitutional: She is oriented to person, place, and time. She appears well-developed and well-nourished.   Using cane.   HENT:   Head: Normocephalic and atraumatic.   Eyes: Pupils are equal, round, and reactive to light. Conjunctivae are normal. "   Neck: Normal range of motion. Neck supple.   Cardiovascular: Normal rate and regular rhythm.    Pulmonary/Chest: Effort normal and breath sounds normal.   Abdominal: Soft. Bowel sounds are normal.   Musculoskeletal: Normal range of motion.   Neurological: She is alert and oriented to person, place, and time.   Skin: Skin is warm and dry.   Psychiatric: She has a normal mood and affect.     CARDIAC STUDIES/PROCEDURES:     ANKLE BRACHIAL INDEX (05/01/18)  No evidence of arterial insufficiency.      CARDIAC CATHETERIZATION CONCLUSIONS (03/20/18)  1.  Multivessel coronary artery disease.  2.  Successful percutaneous transluminal coronary angioplasty/stent placement   of the proximal left anterior descending artery with 2.75x8 mm Synergy   drug-eluting stent.  3.  Successful percutaneous transluminal coronary angioplasty/stent placement   of the mid left anterior descending artery with 2.5x16 mm Synergy drug-eluting stent.  4.  Successful percutaneous transluminal coronary angioplasty/stent placement   of the proximal diagonal branch with 2.5x8 mm Synergy drug-eluting stent.  5.  Successful percutaneous transluminal coronary angioplasty/stent placement   of the ostial first obtuse marginal branch with 2.5x12 mm Synergy drug-eluting stent.     CARDIAC CATHETERIZATION CONCLUSIONS (03/13/18)  1.  Severe aortic stenosis with peak gradient of 100 mmHg, mean gradient of 85   mmHg, calculated JULES of 0.38 cm2.  2.  Severe 3-vessel coronary artery disease with high-grade proximal and mid   LAD stenosis, high-grade ostial proximal diagonal branch stenosis, high-grade   ostial proximal obtuse marginal branch stenosis, and chronic total occlusion   of the mid right coronary artery with distal vessel filling via left to right   collaterals.  3.  Normal left ventricular systolic function with ejection fraction of 60%.  4.  Elevated left ventricular end-diastolic pressure.  5.  Elevated right heart pressure with PA systolic pressure  of 50 mmHg.  6.  Mildly dilated ascending aorta with heavily calcified aortic valve.  7.  Heavily calcified descending abdominal aorta with luminal irregularities   of 30-40%, normal size bilateral iliofemoral system with tortuosity.     CAROTID ULTRASOUND (12/13/17)  Carotid ultrasound showing mild plaques.     CTA OF CHEST AND ABDOMEN (04/19/18)  1.  Severely calcified tricuspid aortic valve annulus measures 460 sq mm  2.  Coronary artery ostia are over 1 cm above the annulus  3.  Iliofemoral runoff calibers are at least 5.8 mm short axis left, 6.1 mm right  4.  Severe coronary artery disease     ECHOCARDIOGRAM CONCLUSIONS (05/01/18)  Normal left ventricular systolic function.  Left ventricular ejection fraction is visually estimated to be 55%.  Grade I diastolic dysfunction.  Moderate concentric left ventricular hypertrophy.  The right ventricle was normal in size and function.  Known TAVR aortic valve that is functioning normally with normal transvalvular gradients.   Vmax is   1.9 m/s. Transvalvular gradients are - Peak: 15 mmHg,  Mean: 8 mmHg.   No paravalvular leak is noted.  Moderate mitral annular calcification.  Normal inferior vena cava size and inspiratory collapse.  Right ventricular systolic pressure is estimated to be 37 mmHg.  Normal pericardium without effusion.     ECHOCARDIOGRAM CONCLUSIONS by Dr. Salcedo (04/30/18)  1)  Severe AS  2)  26 mm Robbie 3  EOA:  2.9 cm^2 Mean Gradient:  9 mmHG  3)  No PVL     ECHOCARDIOGRAM CONCLUSIONS [facility] (11/28/17)  Echocardiogram showing low normal left ventricular systolic function, ejection fraction of 50%, severe aortic stenosis with peak velocity of 4.7 m/s, peak gradient of 91 mmHg, mean gradient of 57 mmHg and calculated JULES of 0.6-0.7 cm2, trace aortic regurgitation, mild mitral regurgitation, mild tricuspid regurgitation and estimated right ventricular systolic pressure of 33 mmHg.     EKG performed on (05/02/18) EKG shows sinus rhythm with left bundle  branch block.  EKG performed on (04/30/18) EKG shows sinus rhythm with left bundle branch block.  EKG performed on (04/30/18) EKG shows sinus rhythm with left bundle branch block.  EKG performed on (03/20/18) EKG shows sinus rhythm.     Laboratory results of (05/02/18) were reviewed. Bun of 17 mg/dl, creatinine levels of 0.9 mg/dl noted.    LOWER EXTREMITY ARTERIAL ULTRASOUND (05/01/18)  No evidence of significant arterial occlusive disease of the right lower extremity.      LOWER EXTREMITY VENOUS ULTRASOUND (05/01/18)  No superficial or deep venous thrombosis.     Assessment:     1. S/P TAVR (transcatheter aortic valve replacement)     2. 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 Coronary artery disease involving native coronary artery of native heart without angina pectoris     4. Stented coronary artery       Medical Decision Making:  Today's Assessment / Status / Plan:     1. Successful transcatheter aortic valve replacement (TAVR) with # 26 [place] Robbie S3 valve, transfemoral approach, under conscious sedation (04/30/18): She is clinically doing well, NYHA class II. We will repeat an echocardiogram.  2. Chronic valvular diastolic congestive heart failure: The overall volume status has improved.  3. Coronary artery disease with stent placement: She is clinically doing well without recurrence of her angina. We will continue with current medical care. We will discontinue Plavix and continue with aspirin therapy only 03/20/19.  4. Deep venous thrombosis in 12/10/17 at [facility]: Her warfarin has been discontinued.    We will follow up in six months.    CC Tai Ramesh

## 2018-08-09 NOTE — LETTER
[facility]   [address], [address]  [place] [address]  Phone: [phone]  Fax: [phone]              Verónica Krishnan  1935    Encounter Date: 8/9/2018    Napoleon Pepe M.D.          PROGRESS NOTE:  No chief complaint on file.      Subjective:   Verónica Krishnan is a 83 y.o. female who presents today for hospital follow up.    Since the discharge from [facility] on 05/02/18 status post TAVR, she has been doing well. She admits to . She denies fatigue, shortness of breath, dyspnea on exertion, chest pain, dizziness or syncope.    Past Medical History:   Diagnosis Date   • Aortic stenosis    • Cancer (HCC) 2015    left breast   • Dental disorder     upper and lower plate   • Heart burn    • Hyperlipidemia    • Hypertension    • Hypoglycemia 2015    patient reports   • Jaundice 1937   • Pneumonia    • Unspecified cataract     bilateral IOL   • Unspecified urinary incontinence    • Urinary bladder disorder      Past Surgical History:   Procedure Laterality Date   • TRANSCATHETER AORTIC VALVE REPLACEMENT  4/30/2018    Procedure: TRANSCATHETER AORTIC VALVE REPLACEMENT;  Surgeon: Napoleon Pepe M.D.;  Location: SURGERY [facility];  Service: Cardiac   • SENG  4/30/2018    Procedure: TTE;  Surgeon: Napoleon Pepe M.D.;  Location: SURGERY [facility];  Service: Cardiac   • MASTECTOMY Left 6/17/2015    Procedure: MASTECTOMY PARTIAL;  Surgeon: Kathy Holley M.D.;  Location: SURGERY SAME DAY [facility];  Service:    • NODE BIOPSY SENTINEL Left 6/17/2015    Procedure: NODE BIOPSY SENTINEL W/LYMPH NODE MAPPING;  Surgeon: Kathy Holley M.D.;  Location: SURGERY SAME DAY [facility];  Service:    • AORTIC VALVE REPLACEMENT      TAVR   • CARDIAC CATH       Family History   Problem Relation Age of Onset   • Heart Attack Mother      Social History     Social History   • Marital status:      Spouse name: N/A   • Number of  children: N/A   • Years of education: N/A     Occupational History   • Not on file.     Social History Main Topics   • Smoking status: Former Smoker     Years: 15.00     Types: Cigarettes     Quit date: 1/1/1970   • Smokeless tobacco: Never Used   • Alcohol use Yes      Comment: occ.    • Drug use: No   • Sexual activity: Not Currently     Other Topics Concern   • Not on file     Social History Narrative   • No narrative on file     Allergies   Allergen Reactions   • Aluminum      Sore throat, nausea, dizzy   • Amlodipine      Not working   • Iron Vomiting, Nausea and Swelling   • Lisinopril Swelling   • Other Food Hives     Oranges     Medications reviewed.    Outpatient Encounter Prescriptions as of 8/9/2018   Medication Sig Dispense Refill   • cloNIDine (CATAPRES) 0.1 MG Tab      • aspirin EC (ECOTRIN) 81 MG Tablet Delayed Response Take 81 mg by mouth every day.     • Hyaluronic Acid-Vitamin C (HYALURONIC ACID PO) Take  by mouth.     • losartan (COZAAR) 25 MG Tab Take 1 Tab by mouth every day. (Patient taking differently: Take 50 mg by mouth 2 Times a Day.) 30 Tab 11   • furosemide (LASIX) 40 MG Tab Take 1 Tab by mouth 2 Times a Day. 60 Tab 11   • Cyanocobalamin 5000 MCG TABLET DISPERSIBLE Take 1 Tab by mouth every day.     • clopidogrel (PLAVIX) 75 MG Tab Take 1 Tab by mouth every day. 30 Tab 11   • alendronate (FOSAMAX) 35 MG tablet Take 70 mg by mouth every 7 days.     • atorvastatin (LIPITOR) 40 MG Tab Take 40 mg by mouth every evening.     • levothyroxine (SYNTHROID) 112 MCG Tab Take 75 mcg by mouth Every morning on an empty stomach.     • metoprolol (LOPRESSOR) 50 MG Tab Take 50 mg by mouth 2 times a day.     • potassium chloride (KLOR-CON) 20 MEQ Pack Take 20 mEq by mouth every day.     • ranitidine (ZANTAC) 150 MG capsule Take 150 mg by mouth 1 time daily as needed.     • Ascorbic Acid (VITAMIN C) 500 MG Cap Take 1 Cap by mouth every day.     • LUTEIN PO Take 40 mg by mouth every day.     • MAGNESIUM  "CITRATE PO Take 1 Tab by mouth every day.     • Zinc 25 MG Tab Take 1 Tab by mouth every day.     • oxybutynin (DITROPAN) 5 MG Tab TAKE 1 TABLET TWICE A  Tab 0   • anastrozole (ARIMIDEX) 1 MG Tab Take 1 mg by mouth every day.     • diphenhydrAMINE (BENADRYL) 25 MG TABS Take 25 mg by mouth 1 time daily as needed for Itching.     • Probiotic Product (PROBIOTIC ACIDOPHILUS BEADS PO) Take 1 Tab by mouth every day.     • Calcium Carbonate 600 MG TABS Take 1 Tab by mouth every day.     • Cholecalciferol (VITAMIN D) 400 UNIT TABS Take 400 Units by mouth 2 Times a Day.     • pantoprazole (PROTONIX) 40 MG Tablet Delayed Response Take 1 Tab by mouth every day. 30 Tab 0     No facility-administered encounter medications on file as of 8/9/2018.      Review of Systems   Constitutional: Negative.  Negative for chills, fever, malaise/fatigue and weight loss.   HENT: Negative.  Negative for hearing loss.    Eyes: Negative.  Negative for blurred vision and double vision.   Respiratory: Negative.  Negative for cough and shortness of breath.    Cardiovascular: Negative.  Negative for chest pain, palpitations, claudication and leg swelling.   Gastrointestinal: Negative.  Negative for abdominal pain, nausea and vomiting.   Genitourinary: Negative.  Negative for dysuria and urgency.   Musculoskeletal: Negative.  Negative for joint pain and myalgias.   Skin: Negative.  Negative for itching and rash.   Neurological: Negative.  Negative for dizziness, focal weakness, weakness and headaches.   Endo/Heme/Allergies: Negative.  Does not bruise/bleed easily.   Psychiatric/Behavioral: Negative.  Negative for depression. The patient is not nervous/anxious.         Objective:   /60   Pulse 64   Ht 1.676 m (5' 6\")   Wt 69.9 kg (154 lb)   SpO2 97%   BMI 24.86 kg/m²      Physical Exam   Constitutional: She is oriented to person, place, and time. She appears well-developed and well-nourished.   HENT:   Head: Normocephalic and " atraumatic.   Eyes: Pupils are equal, round, and reactive to light. Conjunctivae are normal.   Neck: Normal range of motion. Neck supple.   Cardiovascular: Normal rate and regular rhythm.    Pulmonary/Chest: Effort normal and breath sounds normal.   Abdominal: Soft. Bowel sounds are normal.   Musculoskeletal: Normal range of motion.   Neurological: She is alert and oriented to person, place, and time.   Skin: Skin is warm and dry.   Psychiatric: She has a normal mood and affect.     CARDIAC STUDIES/PROCEDURES:     ANKLE BRACHIAL INDEX (05/01/18)  No evidence of arterial insufficiency.      CARDIAC CATHETERIZATION CONCLUSIONS (03/20/18)  1.  Multivessel coronary artery disease.  2.  Successful percutaneous transluminal coronary angioplasty/stent placement   of the proximal left anterior descending artery with 2.75x8 mm Synergy   drug-eluting stent.  3.  Successful percutaneous transluminal coronary angioplasty/stent placement   of the mid left anterior descending artery with 2.5x16 mm Synergy drug-eluting stent.  4.  Successful percutaneous transluminal coronary angioplasty/stent placement   of the proximal diagonal branch with 2.5x8 mm Synergy drug-eluting stent.  5.  Successful percutaneous transluminal coronary angioplasty/stent placement   of the ostial first obtuse marginal branch with 2.5x12 mm Synergy drug-eluting stent.     CARDIAC CATHETERIZATION CONCLUSIONS (03/13/18)  1.  Severe aortic stenosis with peak gradient of 100 mmHg, mean gradient of 85   mmHg, calculated JULES of 0.38 cm2.  2.  Severe 3-vessel coronary artery disease with high-grade proximal and mid   LAD stenosis, high-grade ostial proximal diagonal branch stenosis, high-grade   ostial proximal obtuse marginal branch stenosis, and chronic total occlusion   of the mid right coronary artery with distal vessel filling via left to right   collaterals.  3.  Normal left ventricular systolic function with ejection fraction of 60%.  4.  Elevated left  ventricular end-diastolic pressure.  5.  Elevated right heart pressure with PA systolic pressure of 50 mmHg.  6.  Mildly dilated ascending aorta with heavily calcified aortic valve.  7.  Heavily calcified descending abdominal aorta with luminal irregularities   of 30-40%, normal size bilateral iliofemoral system with tortuosity.     CAROTID ULTRASOUND (12/13/17)  Carotid ultrasound showing mild plaques.     CTA OF CHEST AND ABDOMEN (04/19/18)  1.  Severely calcified tricuspid aortic valve annulus measures 460 sq mm  2.  Coronary artery ostia are over 1 cm above the annulus  3.  Iliofemoral runoff calibers are at least 5.8 mm short axis left, 6.1 mm right  4.  Severe coronary artery disease     ECHOCARDIOGRAM CONCLUSIONS (05/01/18)  Normal left ventricular systolic function.  Left ventricular ejection fraction is visually estimated to be 55%.  Grade I diastolic dysfunction.  Moderate concentric left ventricular hypertrophy.  The right ventricle was normal in size and function.  Known TAVR aortic valve that is functioning normally with normal transvalvular gradients.   Vmax is   1.9 m/s. Transvalvular gradients are - Peak: 15 mmHg,  Mean: 8 mmHg.   No paravalvular leak is noted.  Moderate mitral annular calcification.  Normal inferior vena cava size and inspiratory collapse.  Right ventricular systolic pressure is estimated to be 37 mmHg.  Normal pericardium without effusion.     ECHOCARDIOGRAM CONCLUSIONS by Dr. Salcedo (04/30/18)  1)  Severe AS  2)  26 mm Robbie 3  EOA:  2.9 cm^2 Mean Gradient:  9 mmHG  3)  No PVL     ECHOCARDIOGRAM CONCLUSIONS [facility] (11/28/17)  Echocardiogram showing low normal left ventricular systolic function, ejection fraction of 50%, severe aortic stenosis with peak velocity of 4.7 m/s, peak gradient of 91 mmHg, mean gradient of 57 mmHg and calculated JULES of 0.6-0.7 cm2, trace aortic regurgitation, mild mitral regurgitation, mild tricuspid regurgitation and estimated right ventricular systolic  pressure of 33 mmHg.     EKG performed on (05/02/18) EKG shows sinus rhythm with left bundle branch block.  EKG performed on (04/30/18) EKG shows sinus rhythm with left bundle branch block.  EKG performed on (04/30/18) EKG shows sinus rhythm with left bundle branch block.  EKG performed on (03/20/18) EKG shows sinus rhythm.     Laboratory results of (05/02/18) were reviewed. Bun of 17 mg/dl, creatinine levels of 0.9 mg/dl noted.    LOWER EXTREMITY ARTERIAL ULTRASOUND (05/01/18)  No evidence of significant arterial occlusive disease of the right lower extremity.      LOWER EXTREMITY VENOUS ULTRASOUND (05/01/18)  No superficial or deep venous thrombosis.       Assessment:     1. S/P TAVR (transcatheter aortic valve replacement)     2. 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 Coronary artery disease involving native coronary artery of native heart without angina pectoris     4. Stented coronary artery       Medical Decision Making:  Today's Assessment / Status / Plan:     1. Successful transcatheter aortic valve replacement (TAVR) with # 26 [place] Robbie S3 valve, transfemoral approach, under conscious sedation (04/30/18): She is clinically doing well.  2. Acute on chronic valvular diastolic congestive heart failure: The overall volume status has improved.  3. Coronary artery disease with stent placement: She is clinically doing well without recurrence of her angina. We will continue with current medical care.  4. Deep venous thrombosis in 12/10/17 at [facility] on (warfarin): Her right leg has developed swelling. All of her test for the right lower extremity has been unremarkable. We have restarted warfarin. INR does not need to be therapeutic for discharge.       CC Tai Ramesh           No Recipients

## 2018-08-13 ENCOUNTER — HOSPITAL ENCOUNTER (OUTPATIENT)
Dept: CARDIOLOGY | Facility: MEDICAL CENTER | Age: 83
End: 2018-08-13
Attending: INTERNAL MEDICINE
Payer: COMMERCIAL

## 2018-08-13 DIAGNOSIS — Z95.2 S/P TAVR (TRANSCATHETER AORTIC VALVE REPLACEMENT): ICD-10-CM

## 2018-08-13 PROCEDURE — 93306 TTE W/DOPPLER COMPLETE: CPT | Mod: 26 | Performed by: INTERNAL MEDICINE

## 2018-08-13 PROCEDURE — 93306 TTE W/DOPPLER COMPLETE: CPT

## 2018-08-14 ENCOUNTER — TELEPHONE (OUTPATIENT)
Dept: CARDIOLOGY | Facility: MEDICAL CENTER | Age: 83
End: 2018-08-14

## 2018-08-14 LAB
LV EJECT FRACT  99904: 65
LV EJECT FRACT MOD 2C 99903: 55.68
LV EJECT FRACT MOD 4C 99902: 64.74
LV EJECT FRACT MOD BP 99901: 63.22

## 2018-08-14 NOTE — LETTER
August 14, 2018        Verónica Krishnan  [address]  [facility] [facility] [address]          Dear Verónica,    We have received the results of your recent:    Echocardiogram    Your test came back within normal limits, with normally functioning TAVR valve.  Please follow up as previously discussed with your physician.      Feel free to call us with any questions.        Sincerely,          JASEN Valenzuela./Napoleon Pepe M.D.

## 2018-08-14 NOTE — TELEPHONE ENCOUNTER
----- Message from Bianca Chandra R.N. sent at 8/14/2018  3:12 PM PDT -----      ----- Message -----  From: Napoleon Pepe M.D.  Sent: 8/14/2018   4:13 AM  To: Mckenzie Beard R.N.    Please call patient with unremarkable echocardiogram results showing normally functioning TAVR valve.    Thanks.  SEBASTIAN

## 2018-08-16 ENCOUNTER — ANTICOAGULATION MONITORING (OUTPATIENT)
Dept: VASCULAR LAB | Facility: MEDICAL CENTER | Age: 83
End: 2018-08-16

## 2018-08-16 NOTE — PROGRESS NOTES
[facility]    Per our last discussion, pt stated she refuses to take warfarin.  She met with cardiology and per cardiology notes pt will not be taking warfarin.  Left VM with pt to update the clinic concerning status of anticoagulation.  It appears pt is no longer on warfarin.  Discharged from [facility]     Noman Shukla, PharmD   [facility]    Addendum 8/16/18: Pt called stating cardiology instructed her to stop anticoagulation. She states her PCP would also like anticoagulation stopped.      Noman Shukla, RandyD

## 2023-08-01 NOTE — PROGRESS NOTES
Monitor Summary  SR  70-82  0.20/0.13/0.43  OPVC, PAC   Viscosupplementation Treatment for Arthritis     The first line of treatment for osteoarthritis of the knee aims to relieve pain.  Normally, pain relievers such as ibuprofen or nonsteroidal anti-inflammatory drugs (NSAIDs) are used, along with physical therapy, applications of a topical analgesic and injections of a corticosteroid.  However, some people have a reaction to NSAIDs and these agents usually bring only temporary relief.     Viscosupplementation injects a preparation of hyaluronic acid into the knee joint. Hyaluronic acid is a naturally occurring substance found in the synovial (joint) fluid.  It acts as a lubricant to enable bones to move smoothly over each other and as a shock absorber for joint loads.     People with osteoarthritis (\"wear-and-tear\" arthritis) have a lower-than-normal concentration of hyaluronic acid in their joints.  Viscosupplementation may be a therapeutic option for individuals with osteoarthritis of the knee.     Viscosupplementation has been shown to relieve pain in many patients who cannot get relief from nonmedicinal measures or analgesic drugs.  The technique has been used in [place] and [place] for several years, but the [place] Food and Drug Administration did not approve it until 1997, and then only for treating osteoarthritis of the knee.  Several preparations of hyaluronic acid are now commercially available.     Immediate Effects   Hyaluronic acid does not have an immediate pain-relieving effect.   You may notice a local reaction, such as pain, warmth, and slight swelling immediately after the shot.  These symptoms generally do not last long.  You may want to apply an ice pack to help ease them.   For the first 48 hours after the shot, you should avoid excessive weight bearing on the leg, such as standing for long periods, jogging or heavy lifting.     Longer Term Effects   Over the course of the injections, you may notice that you have less pain in your knee.   Hyaluronic acid  does seem to have anti-inflammatory and pain-relieving properties.  The injections may also stimulate the body to produce more of its own hyaluronic acid.   Effects may last for several months.       Viscosupplementation can be helpful for people whose arthritis has not responded to basic treatments.  It is most effective if the arthritis is in its early stages (mild to moderate).  Some patients may feel pain at the injection site, and occasionally the injections result in increased swelling.  The long-term efficacy of Viscosupplementation is not yet known and research continues in this area.

## 2023-08-10 NOTE — PROGRESS NOTES
Patient continues to complain of 7/10 R. Calf pain with movement and R. Calf has generalized  Edema compared to left.  Cardiology APRN Redet Gutierrez into see patient.  Received order to have arterial study as stat order.  Called ultrasound and tech should be up within hour.     rolling walker

## (undated) DEVICE — SUCTION INSTRUMENT YANKAUER OPEN TIP W/O VENT (50EA/CA)

## (undated) DEVICE — STOPCOCK 3-WAY W/SWIVEL LEVER LOCK (50EA/CA)

## (undated) DEVICE — CATHETER PIGTAIL 6FR 145 (5EA/BX)

## (undated) DEVICE — WIRE GUIDE LUNDQST.035X180 - TSMG-35-180-4-LES ORDER BY BOX (5EA/BX)

## (undated) DEVICE — ARMBOARD  SMALL IV 9 INLONG - (25EA/CA)

## (undated) DEVICE — GLOVE SZ 8 BIOGEL PI MICRO - PF LF (50PR/BX)

## (undated) DEVICE — ELECTRODE 850 FOAM ADHESIVE - HYDROGEL RADIOTRNSPRNT (50/PK)

## (undated) DEVICE — IV SET, NON-VENT PLUMB PUMP

## (undated) DEVICE — GLOVE SZ 7 BIOGEL PI MICRO - PF LF (50PR/BX 4BX/CA)

## (undated) DEVICE — BLANKET UNDERBODY FULL ACCES - (5/CA)

## (undated) DEVICE — ELECTRODE RADIOLUCNT SOLID GEL DEFIB PADS (12EA/CA)

## (undated) DEVICE — KIT ROOM DECONTAMINATION

## (undated) DEVICE — STOPCOCK IV 400 PSI 3W ROT (50EA/BX)

## (undated) DEVICE — SLEEVE, VASO, THIGH, MED

## (undated) DEVICE — DEVICE CLOSER PERCLOSE - (10/BX) PROGLIDE SYSTEM

## (undated) DEVICE — GLOVE SURGICAL LATEX POWDER FREE STIRLE SZ 8 ENCORE MICROPTIC (200PR/CA)

## (undated) DEVICE — SODIUM CHL. INJ. 0.9% 500ML (24EA/CA 50CA/PF)

## (undated) DEVICE — IV TUBING HI-FLO RATE W/CLAMP (50/CA)

## (undated) DEVICE — PACK TAVR (3EA/CA)

## (undated) DEVICE — CANISTER SUCTION 3000ML MECHANICAL FILTER AUTO SHUTOFF MEDI-VAC NONSTERILE LF DISP  (40EA/CA)

## (undated) DEVICE — SET LEADWIRE 5 LEAD BEDSIDE DISPOSABLE ECG (1SET OF 5/EA)

## (undated) DEVICE — KIT RADIAL ARTERY 20GA W/MAX BARRIER AND BIOPATCH  (5EA/CA) #10740 IS FOR THE SET RADIAL ARTERIAL

## (undated) DEVICE — LACTATED RINGERS INJ 1000 ML - (14EA/CA 60CA/PF)

## (undated) DEVICE — CHLORAPREP 26 ML APPLICATOR - ORANGE TINT(25/CA)

## (undated) DEVICE — CABLE TEMPORARY PACING

## (undated) DEVICE — GOWN WARMING STANDARD FLEX - (30/CA)

## (undated) DEVICE — SENSOR SPO2 NEO LNCS ADHESIVE (20/BX) SEE USER NOTES

## (undated) DEVICE — SUTURE OHS

## (undated) DEVICE — BAG DECANTER (50EA/CS)

## (undated) DEVICE — PROTECTOR ULNA NERVE - (36PR/CA)

## (undated) DEVICE — SET FLUID WARMING STANDARD FLOW - (10/CA)

## (undated) DEVICE — Device

## (undated) DEVICE — SET BIFURCATED BLOOD - (48EA/CS)

## (undated) DEVICE — SOD. CHL. INJ. 0.9% 1000 ML - (14EA/CA 60CA/PF)

## (undated) DEVICE — DERMABOND ADVANCED - (12EA/BX)

## (undated) DEVICE — GUIDEWIRE STARTER STRAIGHT FIXED CORE .035 150CM 4 STRAIGHT PTFE/HEPARIN COATED (10/BX)

## (undated) DEVICE — CATHETER 6FR AL1 100CM (5/BX)

## (undated) DEVICE — GLOVE SZ 6.5 BIOGEL PI MICRO - PF LF (50PR/BX)

## (undated) DEVICE — WIRE GUIDE AES .035 260CM WITH 3MM J TIP"

## (undated) DEVICE — NEEDLE PERCUTANEOUS THINWALL 7CM 18GA BSDN 18-7.0

## (undated) DEVICE — DRAPE MAYO STAND - (30/CA)

## (undated) DEVICE — ELECTRODE DUAL RETURN W/ CORD - (50/PK)

## (undated) DEVICE — SUTURE 4-0 VICRYL PLUS SH - UNDYED 27 INCH (36PK/BX)

## (undated) DEVICE — MICRODRIP PRIMARY VENTED 60 (48EA/CA) THIS WAS PART #2C8428 WHICH WAS DISCONTINUED

## (undated) DEVICE — GUIDEWIRE STARTER J CURVED FIXED CORE .035 260CM 10 3MM PTFE/HEPARIN COATED (5EA/BX)

## (undated) DEVICE — SUCTION INSTRUMENT YANKAUER BULBOUS TIP W/O VENT (50EA/CA)

## (undated) DEVICE — COVER LIGHT HANDLE FLEXIBLE - SOFT (2EA/PK 80PK/CA)

## (undated) DEVICE — HEAD HOLDER JUNIOR/ADULT

## (undated) DEVICE — MASK ANESTHESIA ADULT  - (100/CA)

## (undated) DEVICE — SYS DLV COST CLS RM TEMP - INJECTATE (CO-SET II) (10EA/CA)

## (undated) DEVICE — TRANSDUCER BIFURCATED MONITORING KIT (10EA/CA)

## (undated) DEVICE — TUBE CONNECT SUCTION CLEAR 120 X 1/4" (50EA/CA)"

## (undated) DEVICE — TUBING PRSS MNTR 72IN M/ M LL - (25/BX) MONIT. LINE W/MALE L/L

## (undated) DEVICE — SUTURE 0 ETHIBOND MO6 C/R - (12/BX) 8-18 INCH ETHICON

## (undated) DEVICE — CONTAINER, SPECIMEN, STERILE

## (undated) DEVICE — SET EXTENSION WITH 2 PORTS (48EA/CA) ***PART #2C8610 IS A SUBSTITUTE*****

## (undated) DEVICE — INTRODUCER CATHETER  DILATOR PROTRUDING 8FR 2.5CM (10EA/BX)

## (undated) DEVICE — KIT ANESTHESIA W/CIRCUIT & 3/LT BAG W/FILTER (20EA/CA)

## (undated) DEVICE — GOWN SURGEONS X-LARGE - DISP. (30/CA)

## (undated) DEVICE — BAG RESUSCITATION DISPOSABLE - WITH MASK (10 EA/CA)

## (undated) DEVICE — DRESSING TRANSPARENT FILM TEGADERM 4 X 4.75" (50EA/BX)"

## (undated) DEVICE — TUBING CLEARLINK DUO-VENT - C-FLO (48EA/CA)

## (undated) DEVICE — GLOVE BIOGEL PI ORTHO SZ 7.5 PF LF (40PR/BX)

## (undated) DEVICE — INTRODUCER SHEATH 6FR 2.5CM - DILATOR PROTRUDING (10/BX)

## (undated) DEVICE — BLADE SURGICAL #11 - (50/BX)